# Patient Record
Sex: MALE | Race: WHITE | NOT HISPANIC OR LATINO | Employment: FULL TIME | URBAN - METROPOLITAN AREA
[De-identification: names, ages, dates, MRNs, and addresses within clinical notes are randomized per-mention and may not be internally consistent; named-entity substitution may affect disease eponyms.]

---

## 2020-08-06 ENCOUNTER — TELEPHONE (OUTPATIENT)
Dept: SCHEDULING | Facility: IMAGING CENTER | Age: 55
End: 2020-08-06

## 2020-08-09 SDOH — HEALTH STABILITY: MENTAL HEALTH
STRESS IS WHEN SOMEONE FEELS TENSE, NERVOUS, ANXIOUS, OR CAN'T SLEEP AT NIGHT BECAUSE THEIR MIND IS TROUBLED. HOW STRESSED ARE YOU?: ONLY A LITTLE

## 2020-08-09 SDOH — HEALTH STABILITY: PHYSICAL HEALTH: ON AVERAGE, HOW MANY DAYS PER WEEK DO YOU ENGAGE IN MODERATE TO STRENUOUS EXERCISE (LIKE A BRISK WALK)?: 5 DAYS

## 2020-08-09 SDOH — ECONOMIC STABILITY: TRANSPORTATION INSECURITY
IN THE PAST 12 MONTHS, HAS THE LACK OF TRANSPORTATION KEPT YOU FROM MEDICAL APPOINTMENTS OR FROM GETTING MEDICATIONS?: NO

## 2020-08-09 SDOH — ECONOMIC STABILITY: HOUSING INSECURITY
IN THE LAST 12 MONTHS, WAS THERE A TIME WHEN YOU DID NOT HAVE A STEADY PLACE TO SLEEP OR SLEPT IN A SHELTER (INCLUDING NOW)?: NO

## 2020-08-09 SDOH — ECONOMIC STABILITY: HOUSING INSECURITY: IN THE LAST 12 MONTHS, HOW MANY PLACES HAVE YOU LIVED?: 3

## 2020-08-09 SDOH — ECONOMIC STABILITY: INCOME INSECURITY: IN THE LAST 12 MONTHS, WAS THERE A TIME WHEN YOU WERE NOT ABLE TO PAY THE MORTGAGE OR RENT ON TIME?: NO

## 2020-08-09 SDOH — HEALTH STABILITY: PHYSICAL HEALTH: ON AVERAGE, HOW MANY MINUTES DO YOU ENGAGE IN EXERCISE AT THIS LEVEL?: 120 MINUTES

## 2020-08-09 SDOH — ECONOMIC STABILITY: TRANSPORTATION INSECURITY
IN THE PAST 12 MONTHS, HAS LACK OF RELIABLE TRANSPORTATION KEPT YOU FROM MEDICAL APPOINTMENTS, MEETINGS, WORK OR FROM GETTING THINGS NEEDED FOR DAILY LIVING?: NO

## 2020-08-09 ASSESSMENT — SOCIAL DETERMINANTS OF HEALTH (SDOH)
DO YOU BELONG TO ANY CLUBS OR ORGANIZATIONS SUCH AS CHURCH GROUPS UNIONS, FRATERNAL OR ATHLETIC GROUPS, OR SCHOOL GROUPS?: NO
HOW OFTEN DO YOU GET TOGETHER WITH FRIENDS OR RELATIVES?: NEVER
HOW MANY DRINKS CONTAINING ALCOHOL DO YOU HAVE ON A TYPICAL DAY WHEN YOU ARE DRINKING: 1 OR 2
HOW OFTEN DO YOU HAVE A DRINK CONTAINING ALCOHOL: 2-4 TIMES A MONTH
HOW OFTEN DO YOU ATTEND CHURCH OR RELIGIOUS SERVICES?: NEVER
HOW HARD IS IT FOR YOU TO PAY FOR THE VERY BASICS LIKE FOOD, HOUSING, MEDICAL CARE, AND HEATING?: NOT HARD AT ALL
IN A TYPICAL WEEK, HOW MANY TIMES DO YOU TALK ON THE PHONE WITH FAMILY, FRIENDS, OR NEIGHBORS?: THREE TIMES A WEEK
WITHIN THE PAST 12 MONTHS, THE FOOD YOU BOUGHT JUST DIDN'T LAST AND YOU DIDN'T HAVE MONEY TO GET MORE: NEVER TRUE
WITHIN THE PAST 12 MONTHS, YOU WORRIED THAT YOUR FOOD WOULD RUN OUT BEFORE YOU GOT THE MONEY TO BUY MORE: NEVER TRUE
HOW OFTEN DO YOU ATTENT MEETINGS OF THE CLUB OR ORGANIZATION YOU BELONG TO?: NEVER
HOW OFTEN DO YOU HAVE SIX OR MORE DRINKS ON ONE OCCASION: NEVER

## 2020-08-13 ENCOUNTER — OFFICE VISIT (OUTPATIENT)
Dept: MEDICAL GROUP | Facility: PHYSICIAN GROUP | Age: 55
End: 2020-08-13
Payer: COMMERCIAL

## 2020-08-13 VITALS
RESPIRATION RATE: 16 BRPM | HEIGHT: 69 IN | BODY MASS INDEX: 26.45 KG/M2 | SYSTOLIC BLOOD PRESSURE: 128 MMHG | HEART RATE: 60 BPM | WEIGHT: 178.6 LBS | OXYGEN SATURATION: 95 % | DIASTOLIC BLOOD PRESSURE: 78 MMHG | TEMPERATURE: 97.4 F

## 2020-08-13 DIAGNOSIS — L98.9 NON-HEALING SKIN LESION: ICD-10-CM

## 2020-08-13 DIAGNOSIS — M76.62 ACHILLES TENDINITIS OF BOTH LOWER EXTREMITIES: ICD-10-CM

## 2020-08-13 DIAGNOSIS — M76.61 ACHILLES TENDINITIS OF BOTH LOWER EXTREMITIES: ICD-10-CM

## 2020-08-13 DIAGNOSIS — Z00.00 WELLNESS EXAMINATION: ICD-10-CM

## 2020-08-13 DIAGNOSIS — R79.89 ELEVATED FERRITIN LEVEL: ICD-10-CM

## 2020-08-13 DIAGNOSIS — Z23 NEED FOR VACCINATION: ICD-10-CM

## 2020-08-13 PROCEDURE — 90750 HZV VACC RECOMBINANT IM: CPT | Performed by: PHYSICIAN ASSISTANT

## 2020-08-13 PROCEDURE — 90715 TDAP VACCINE 7 YRS/> IM: CPT | Performed by: PHYSICIAN ASSISTANT

## 2020-08-13 PROCEDURE — 90472 IMMUNIZATION ADMIN EACH ADD: CPT | Performed by: PHYSICIAN ASSISTANT

## 2020-08-13 PROCEDURE — 90471 IMMUNIZATION ADMIN: CPT | Performed by: PHYSICIAN ASSISTANT

## 2020-08-13 PROCEDURE — 99204 OFFICE O/P NEW MOD 45 MIN: CPT | Mod: 25 | Performed by: PHYSICIAN ASSISTANT

## 2020-08-13 RX ORDER — ASPIRIN 81 MG/1
TABLET ORAL
COMMUNITY

## 2020-08-13 RX ORDER — MELOXICAM 15 MG/1
15 TABLET ORAL DAILY
Qty: 30 TAB | Refills: 0 | Status: SHIPPED | OUTPATIENT
Start: 2020-08-13 | End: 2020-09-08

## 2020-08-13 ASSESSMENT — PATIENT HEALTH QUESTIONNAIRE - PHQ9: CLINICAL INTERPRETATION OF PHQ2 SCORE: 0

## 2020-08-13 NOTE — PROGRESS NOTES
Chief Complaint   Patient presents with   • Establish Care   • Rash     L upper lip area    • Tendonitis     L        HISTORY OF THE PRESENT ILLNESS: Eva Rizo is a 55 y.o. male new patient to our practice. This pleasant patient is here today to establish care and to discuss the evaluation and management of:    Achilles tendinitis of both lower extremities  Patient is a pleasant 55-year-old male here today discuss Achilles pain.  He tells me several months ago he started working on high intensity training.  States he is working on increasing his time and since doing that he has been experiencing pain of bilateral Achilles.  States on average she was running 15-20 miles per week but this was nothing abnormal for him.  States he is tried over-the-counter anti-inflammatories intermittently with no improvement of symptoms.  States he is also done at home physical therapy exercises with no improvement symptoms.  States he has not been running for several weeks now and would like to get back to running.  He needs to wearing supportive shoes.  Denies trauma.  Patient is inquiring what treatment options.    Elevated ferritin level  Patient states he has a positive medical history for elevated ferritin levels.  States he has been getting lab work every 6 months and ferritin levels have been trending downward.  He tells me that he is asymptomatic.  Will repeat lab work.    Non-healing skin lesion  Patient states about a month ago he developed a slightly smaller than a pea-sized bump above right upper lip just above his mustache.  States occasionally bump will burn and scab.  States is a nonhealing lesion.  Denies erythema/warmth/size changes/irregular borders.  He denies personal history of skin cancer.  Hemosiderin sunscreen.  Denies other suspicious lesions.      No past medical history on file.    Past Surgical History:   Procedure Laterality Date   • APPENDECTOMY     • CHOLECYSTECTOMY     • INGUINAL HERNIA REPAIR Right     • SPLENECTOMY         Family Status   Relation Name Status   • Mo  Alive   • Fa  Alive   • Sis  Alive   • Bro  Alive   • Sis  Alive   • Bro  Alive   • Bro  Alive   • Bro  Alive   • Son  Alive   • Liliana  Alive     Family History   Problem Relation Age of Onset   • No Known Problems Mother    • Hyperlipidemia Father    • Hypertension Father    • Stroke Father    • Heart Disease Father         CHF   • No Known Problems Son    • No Known Problems Daughter        Social History     Tobacco Use   • Smoking status: Never Smoker   • Smokeless tobacco: Never Used   Substance Use Topics   • Alcohol use: Yes     Frequency: 2-4 times a month     Drinks per session: 1 or 2     Binge frequency: Never     Comment: beer   • Drug use: Never       Allergies: Patient has no known allergies.    Current Outpatient Medications Ordered in Epic   Medication Sig Dispense Refill   • aspirin (ASPIRIN 81) 81 MG EC tablet Take  by mouth.     • meloxicam (MOBIC) 15 MG tablet Take 1 Tab by mouth every day. 30 Tab 0     No current Epic-ordered facility-administered medications on file.        Review of Systems   Constitutional: Negative for fever, chills, weight loss and malaise/fatigue.   HENT: Negative for ear pain, nosebleeds, congestion, sore throat and neck pain.    Eyes: Negative for blurred vision.   Respiratory: Negative for cough, sputum production, shortness of breath and wheezing.    Cardiovascular: Negative for chest pain, palpitations, orthopnea and leg swelling.   Gastrointestinal: Negative for heartburn, nausea, vomiting and abdominal pain.   Genitourinary: Negative for dysuria, urgency and frequency.   Musculoskeletal: Negative for myalgias, back pain and joint pain.  Positive for bilateral Achilles pain.  Skin: Negative for rash and itching.  Positive for nonhealing lesion of upper lip.  Neurological: Negative for dizziness, tingling, tremors, sensory change, focal weakness and headaches.   Endo/Heme/Allergies: Does not  "bruise/bleed easily.   Psychiatric/Behavioral: Negative for depression, anxiety, or memory loss.     All other systems reviewed and are negative except as in HPI.    Exam: /78 (BP Location: Left arm, Patient Position: Sitting)   Pulse 60   Temp 36.3 °C (97.4 °F) (Temporal)   Resp 16   Ht 1.753 m (5' 9\")   Wt 81 kg (178 lb 9.6 oz)   SpO2 95%  Body mass index is 26.37 kg/m².  General: Normal appearing. No distress.  HEENT: Normocephalic. Eyes conjunctiva clear lids without ptosis, pupils equal and reactive to light accommodation, ears normal shape and contour, canals are clear bilaterally, tympanic membranes are benign, nasal mucosa benign, oropharynx is without erythema, edema or exudates.   Neck: Supple without JVD or bruit. Thyroid is not enlarged.  Pulmonary: Clear to ausculation.  Normal effort. No rales, ronchi, or wheezing.  Cardiovascular: Regular rate and rhythm without murmur. Carotid and radial pulses are intact and equal bilaterally.  Abdomen: Soft, nontender, nondistended. Normal bowel sounds. Liver and spleen are not palpable  Neurologic: Grossly nonfocal  Lymph: No cervical, supraclavicular or axillary lymph nodes are palpable  Skin: Warm and dry.  Negative for rash.  Positive for sinus more than a pea-sized firm mass above right upper lip with slight larger than pinhead-sized nonhealing lesion near the center of the mass.  Negative for erythema/warmth/tenderness with palpation.  Negative for irregular borders.  Mass is flesh-colored.  Musculoskeletal: Normal gait. No extremity cyanosis, clubbing, or edema.  Positive for pain over bilateral Achilles insertion points.  No signs of trauma.  Negative for erythema/warmth/swelling/discharge.  Psych: Normal mood and affect. Alert and oriented x3. Judgment and insight is normal.      Medical decision-making and discussion:  1. Achilles tendinitis of both lower extremities  Patient has been prescribed Mobic 50 mg tab has been advised to take 1 " tablet by mouth once daily with food for 2 weeks.  Continue at home physical therapy exercise.  Continue wearing supportive shoes.  Patient has been furred to sports medicine as well physical therapy for further evaluation.  Patient good plan.  Suggested 0 drop running shoes.  He tells me in the past that he wore 0 drop running shoes and did not experience unpleasant side effects or complications when doing so.    - meloxicam (MOBIC) 15 MG tablet; Take 1 Tab by mouth every day.  Dispense: 30 Tab; Refill: 0  - REFERRAL TO PHYSICAL THERAPY Reason for Therapy: Eval/Treat/Report  - REFERRAL TO SPORTS MEDICINE    2. Elevated ferritin level  Chronic with stable problem.  Lab work has been ordered.  Patient be contacted with results.  Continue to monitor closely.    - CBC WITH DIFFERENTIAL; Future  - FERRITIN; Future  - IRON/TOTAL IRON BIND; Future    3. Non-healing skin lesion  Continue wearing sunscreen.  Avoid irritating area of concern.  Patient has been referred to dermatology.    - REFERRAL TO DERMATOLOGY    4. Need for vaccination  Shingrix and Tdap vaccinations were administered patient without complications.  Patient was provided VIS form.  - Tdap Vaccine =>8YO IM  - Shingrix Vaccine    5. Wellness examination    - CBC WITH DIFFERENTIAL; Future  - Comp Metabolic Panel; Future  - Lipid Profile; Future  - FERRITIN; Future  - IRON/TOTAL IRON BIND; Future    Tells me that his colonoscopy is up-to-date.  States he completed colonoscopy 2 years ago and there were no abnormal findings.  States colonoscopy was completed while he was living in [facility].    Please note that this dictation was created using voice recognition software. I have made every reasonable attempt to correct obvious errors, but I expect that there are errors of grammar and possibly content that I did not discover before finalizing the note.      Assessment/Plan  1. Achilles tendinitis of both lower extremities  meloxicam (MOBIC) 15 MG tablet     REFERRAL TO PHYSICAL THERAPY Reason for Therapy: Eval/Treat/Report    REFERRAL TO SPORTS MEDICINE   2. Elevated ferritin level  CBC WITH DIFFERENTIAL    FERRITIN    IRON/TOTAL IRON BIND   3. Non-healing skin lesion  REFERRAL TO DERMATOLOGY   4. Need for vaccination  Tdap Vaccine =>8YO IM    Shingrix Vaccine   5. Wellness examination  CBC WITH DIFFERENTIAL    Comp Metabolic Panel    Lipid Profile    FERRITIN    IRON/TOTAL IRON BIND       Return in about 1 year (around 8/13/2021), or if symptoms worsen or fail to improve.

## 2020-08-21 ENCOUNTER — OFFICE VISIT (OUTPATIENT)
Dept: MEDICAL GROUP | Facility: CLINIC | Age: 55
End: 2020-08-21
Payer: COMMERCIAL

## 2020-08-21 VITALS
HEIGHT: 69 IN | WEIGHT: 178.6 LBS | OXYGEN SATURATION: 98 % | DIASTOLIC BLOOD PRESSURE: 74 MMHG | SYSTOLIC BLOOD PRESSURE: 116 MMHG | BODY MASS INDEX: 26.45 KG/M2 | RESPIRATION RATE: 14 BRPM | HEART RATE: 74 BPM | TEMPERATURE: 98.2 F

## 2020-08-21 DIAGNOSIS — M76.61 ACHILLES TENDINITIS OF BOTH LOWER EXTREMITIES: ICD-10-CM

## 2020-08-21 DIAGNOSIS — M65.279: ICD-10-CM

## 2020-08-21 DIAGNOSIS — M76.62 ACHILLES TENDINITIS OF BOTH LOWER EXTREMITIES: ICD-10-CM

## 2020-08-21 PROCEDURE — 99203 OFFICE O/P NEW LOW 30 MIN: CPT | Mod: 25 | Performed by: FAMILY MEDICINE

## 2020-08-21 PROCEDURE — 76882 US LMTD JT/FCL EVL NVASC XTR: CPT | Mod: LT | Performed by: FAMILY MEDICINE

## 2020-08-21 ASSESSMENT — ENCOUNTER SYMPTOMS
FEVER: 0
CHILLS: 0
VOMITING: 0
NAUSEA: 0
DIZZINESS: 0
SHORTNESS OF BREATH: 0

## 2020-08-21 NOTE — PROGRESS NOTES
Subjective:      Darrius Velasquez is a 55 y.o. male who presents with Ankle Pain (Referral from PCP/ Bilateral achilles pain)     Referred by Jada Greene P.A.-C. for evaluation of BILATERAL achilles pain (LEFT > RIGHT)    HPI   BILATERAL achilles pain (LEFT > RIGHT)  Right heel started April 29, 2020 after starting interval workouts  Left heel started May 7, 2020  Pain is predominantly at the Achilles insertion  Pain is sharp when he runs  Improved with rest  Occasionally takes  Has progressed with eccentric exercises as per physical therapy and iron  Meloxicam has not helped much  POSITIVE prior history of LEFT great toe fracture a few months prior to his Achilles pain  No night symptoms  Denies any prior issues with Achilles or lower extremity injuries other than the LEFT great toe mentioned above    Has not been running since early July (roughly 6-7 weeks)  Started physical therapy May 23 in [place]  Had PT for 2 months, stopped at the end of July    Works at the [employer], [employer] work  Likes to run, 10 miles or so on trails on weekends and a few miles during the week  Likes swimming, lifting weights    Review of Systems   Constitutional: Negative for chills and fever.   Respiratory: Negative for shortness of breath.    Cardiovascular: Negative for chest pain.   Gastrointestinal: Negative for nausea and vomiting.   Neurological: Negative for dizziness.     PMH:  has no past medical history on file.  MEDS:   Current Outpatient Medications:   •  aspirin (ASPIRIN 81) 81 MG EC tablet, Take  by mouth., Disp: , Rfl:   •  meloxicam (MOBIC) 15 MG tablet, Take 1 Tab by mouth every day., Disp: 30 Tab, Rfl: 0  ALLERGIES: No Known Allergies  SURGHX:   Past Surgical History:   Procedure Laterality Date   • APPENDECTOMY     • CHOLECYSTECTOMY     • INGUINAL HERNIA REPAIR Right    • MENISCECTOMY Right    • SPLENECTOMY       SOCHX:  reports that he has never smoked. He has never used smokeless tobacco. He reports  "current alcohol use. He reports that he does not use drugs.  FH: Family history was reviewed, no pertinent findings to report     Objective:     /74 (BP Location: Right arm, Patient Position: Sitting, BP Cuff Size: Adult)   Pulse 74   Temp 36.8 °C (98.2 °F) (Temporal)   Resp 14   Ht 1.753 m (5' 9\")   Wt 81 kg (178 lb 9.6 oz)   SpO2 98%   BMI 26.37 kg/m²      Physical Exam     RIGHT ANKLE:  There is NO swelling noted at the ankle  Range of motion intact with dorsiflexion and plantarflexion, inversion and eversion  There is NO tenderness of the ATFL, CF or PTF ligament  There is NO tenderness of the lateral malleolus or medial malleolus  Anterior drawer testing is NEGATIVE  Talar tilt testing is NEGATIVE  The foot and ankle is otherwise neurovascularly intact    RIGHT FOOT:  There is NO swelling noted at the foot  There is NO tenderness at the base of the fifth metatarsal, cuboid, or tarsal navicular  There is NO pain with metatarsal squeeze test    LEFT ANKLE:  There is MILD swelling noted at the ankle along the insertion of the Achilles  Range of motion intact with dorsiflexion and plantarflexion, inversion and eversion  There is NO tenderness of the ATFL, CF or PTF ligament  There is NO tenderness of the lateral malleolus or medial malleolus  POSITIVE tenderness along the insertion of the Achilles particularly at the lateral aspect  Anterior drawer testing is NEGATIVE  Talar tilt testing is NEGATIVE  The foot and ankle is otherwise neurovascularly intact    LEFT FOOT:  There is NO swelling noted at the foot  There is NO tenderness at the base of the fifth metatarsal, cuboid, or tarsal navicular  There is NO pain with metatarsal squeeze test    NEUTRAL stance  Able to ambulate with minimally antalgic gait       Assessment/Plan:        1. Achilles tendinitis of both lower extremities (worse than LEFT, insertional)     2. Calcific tendinitis of ankle, BOTH ankles       LEFT ACHILLES worse than the " right  Likes to run, a few miles during the week and likes 10 mile runs on the weekends    Recommended he space out his running with non-offending activities such as cycling or swimming  Recommended orthotic and provided information on super feet orthotic    He has already undergone formal physical therapy prior to moving here from [place]  Recommended continuing eccentric strengthening program and balance training    We discussed the option of PRP versus Tenex  Given the size of the calcifications, he may be a better candidate for Tenex procedure    Return in about 6 weeks (around 10/2/2020).  To see how he is doing                        Thank you Jada Greene P.A.-C. for allowing me to participate in caring for your patient.

## 2020-08-21 NOTE — PROCEDURES
Musculoskeletal ultrasound evaluation of the LEFT and right Achilles tendon  Short and long axis views obtained  Calcification noted at the insertion of BOTH Achilles tendons with anechoic regions along the insertion points  Consistent with calcific tendinopathy

## 2020-08-21 NOTE — PATIENT INSTRUCTIONS
Superfeet orthotics can be purchased online, at any running store or at [facility]

## 2020-09-06 DIAGNOSIS — M76.61 ACHILLES TENDINITIS OF BOTH LOWER EXTREMITIES: ICD-10-CM

## 2020-09-06 DIAGNOSIS — M76.62 ACHILLES TENDINITIS OF BOTH LOWER EXTREMITIES: ICD-10-CM

## 2020-09-08 RX ORDER — MELOXICAM 15 MG/1
TABLET ORAL
Qty: 30 TAB | Refills: 0 | Status: SHIPPED | OUTPATIENT
Start: 2020-09-08 | End: 2021-09-30

## 2020-09-18 ENCOUNTER — OFFICE VISIT (OUTPATIENT)
Dept: MEDICAL GROUP | Facility: CLINIC | Age: 55
End: 2020-09-18
Payer: COMMERCIAL

## 2020-09-18 VITALS
OXYGEN SATURATION: 97 % | WEIGHT: 178.6 LBS | HEART RATE: 71 BPM | BODY MASS INDEX: 26.45 KG/M2 | HEIGHT: 69 IN | RESPIRATION RATE: 16 BRPM | DIASTOLIC BLOOD PRESSURE: 78 MMHG | SYSTOLIC BLOOD PRESSURE: 118 MMHG | TEMPERATURE: 97.2 F

## 2020-09-18 DIAGNOSIS — M76.61 ACHILLES TENDINITIS OF BOTH LOWER EXTREMITIES: ICD-10-CM

## 2020-09-18 DIAGNOSIS — M76.62 ACHILLES TENDINITIS OF BOTH LOWER EXTREMITIES: ICD-10-CM

## 2020-09-18 DIAGNOSIS — M65.279: ICD-10-CM

## 2020-09-18 PROCEDURE — 99213 OFFICE O/P EST LOW 20 MIN: CPT | Performed by: FAMILY MEDICINE

## 2020-09-18 NOTE — PATIENT INSTRUCTIONS
Tenex versus PRP    [email or web address]    [email or web address]

## 2020-09-18 NOTE — PROGRESS NOTES
"Subjective:      Darrius Velasquez is a 55 y.o. male who presents with Ankle Pain (Referral from PCP/ Bilateral achilles pain)     Referred by Jada Greene P.A.-C. for evaluation of BILATERAL achilles pain (LEFT > RIGHT)    HPI   BILATERAL achilles pain (LEFT > RIGHT)  Right heel started April 29, 2020 after starting interval workouts  Left heel started May 7, 2020  Pain is predominantly at the Achilles insertion  Improved with rest  Has progressed with eccentric exercises as per physical therapy and iron  Denies any prior issues with Achilles or lower extremity injuries other than the LEFT great toe mentioned above    Tolerating 4 miles in orthotics, helped some  Weights, cross training  Done with meloxicam and then plateaued   Eccentric exercises, felt stronger with them   Running is his worse activity    Has not been running since early July (roughly 6-7 weeks)  He did some physical therapy in [place]  Had PT for 2 months, stopped at the end of July    Works at the [employer], [employer] work  Likes to run, 10 miles or so on trails on weekends and a few miles during the week  Likes swimming, lifting weights    Objective     /78 (BP Location: Left arm, Patient Position: Sitting, BP Cuff Size: Adult)   Pulse 71   Temp 36.2 °C (97.2 °F) (Temporal)   Resp 16   Ht 1.753 m (5' 9\")   Wt 81 kg (178 lb 9.6 oz)   SpO2 97%   BMI 26.37 kg/m²     Physical Exam     RIGHT ANKLE:  There is NO swelling noted at the ankle  Range of motion intact with dorsiflexion and plantarflexion, inversion and eversion  There is NO tenderness of the ATFL, CF or PTF ligament  There is NO tenderness of the lateral malleolus or medial malleolus  Anterior drawer testing is NEGATIVE  Talar tilt testing is NEGATIVE  The foot and ankle is otherwise neurovascularly intact    RIGHT FOOT:  There is NO swelling noted at the foot  There is NO tenderness at the base of the fifth metatarsal, cuboid, or tarsal navicular  There is NO pain " with metatarsal squeeze test    LEFT ANKLE:  There is MILD swelling noted at the ankle along the insertion of the Achilles  Range of motion intact with dorsiflexion and plantarflexion, inversion and eversion  There is NO tenderness of the ATFL, CF or PTF ligament  There is NO tenderness of the lateral malleolus or medial malleolus  POSITIVE tenderness along the insertion of the Achilles particularly at the lateral aspect  Anterior drawer testing is NEGATIVE  Talar tilt testing is NEGATIVE  The foot and ankle is otherwise neurovascularly intact    LEFT FOOT:  There is NO swelling noted at the foot  There is NO tenderness at the base of the fifth metatarsal, cuboid, or tarsal navicular  There is NO pain with metatarsal squeeze test    NEUTRAL stance  Able to ambulate with minimally NORMAL gait     Assessment/Plan:        1. Achilles tendinitis of both lower extremities (L > R)  REFERRAL TO PHYSICAL THERAPY Reason for Therapy: Eval/Treat/Report   2. Calcific tendinitis of ankle, BOTH ankles  REFERRAL TO PHYSICAL THERAPY Reason for Therapy: Eval/Treat/Report     LEFT ACHILLES worse than the right  Likes to run, a few miles during the week and likes 10 mile runs on the weekends  Unfortunately, he is only tolerating 4 mile runs    Recommended he space out his running with non-offending activities such as cycling or swimming  super feet orthotic helped some     Overall his progress has PLATEAUED    We discussed the option of PRP versus Tenex  Given the size of the calcifications, he may be a better candidate for Tenex procedure      He would like to try some more formal PT (ACTIVE), Omar  He will let us know if you would like to proceed with PRP versus Tenex procedure                        Thank you Jada Greene P.A.-C. for allowing me to participate in caring for your patient.

## 2020-10-22 ENCOUNTER — OFFICE VISIT (OUTPATIENT)
Dept: DERMATOLOGY | Facility: IMAGING CENTER | Age: 55
End: 2020-10-22
Payer: COMMERCIAL

## 2020-10-22 VITALS — WEIGHT: 175 LBS | TEMPERATURE: 97.8 F | BODY MASS INDEX: 25.92 KG/M2 | HEIGHT: 69 IN

## 2020-10-22 DIAGNOSIS — Q89.01 ASPLENIA: ICD-10-CM

## 2020-10-22 DIAGNOSIS — L90.5 SCAR: ICD-10-CM

## 2020-10-22 DIAGNOSIS — D58.0 HEREDITARY SPHEROCYTOSIS (HCC): ICD-10-CM

## 2020-10-22 PROCEDURE — 99202 OFFICE O/P NEW SF 15 MIN: CPT | Performed by: DERMATOLOGY

## 2020-10-22 NOTE — PROGRESS NOTES
CC: Skin Lesion    Subjective: new patient here for spot check on nose.    HPI/location: right below nostril, gray papule, crusty  Time present: 3-6 mos  Painful lesion: No  Itching lesion: No  Enlarging lesion: No  Anything make it better or worse? No  Has improved since appt was made.  Patient scratched at site and then it resolved. Has not recurred.    History of skin cancer: No  History of precancers/actinic keratoses: No  History of biopsies:yse, nevus abdomen and Lentigo maligna right cheek? 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    ROS: no fevers/chills. No itch.  No cough  DermPMH: no skin cancer/melanoma  No problem-specific Assessment & Plan notes found for this encounter.    Relevant PMH: healthy  Social: never smoker; moved to [place] from [place] recently.    PE: Gen:WDWN male in NAD.  Skin:  Focused exam. Normal exam of skin of Face/eyes/lips/neck/no neck LAD palpated  Scar on right lateral cheek well healed without pigment/nodularity.  Declined additional exam of skin    A/P: hx atypical proliferation, right facial cheek, lentigo maligna?, s/p excision now with healed scar, appearing without suspicious features today:  -reviewed excision path with patient  -reviewed signs/sx of ABCDEs/melanoma  -sunprotection reviewed    Nose papule, resolved:  -RTC for regrowth/suspicious signs/sx    I have reviewed medications relevant to my specialty.    F/u PRN, declined scheduled f/u appt

## 2021-03-05 ENCOUNTER — OFFICE VISIT (OUTPATIENT)
Dept: URGENT CARE | Facility: CLINIC | Age: 56
End: 2021-03-05
Payer: COMMERCIAL

## 2021-03-05 VITALS
SYSTOLIC BLOOD PRESSURE: 126 MMHG | DIASTOLIC BLOOD PRESSURE: 76 MMHG | WEIGHT: 182 LBS | HEIGHT: 69 IN | OXYGEN SATURATION: 95 % | HEART RATE: 70 BPM | TEMPERATURE: 97.5 F | RESPIRATION RATE: 18 BRPM | BODY MASS INDEX: 26.96 KG/M2

## 2021-03-05 DIAGNOSIS — H01.001 BLEPHARITIS OF RIGHT UPPER EYELID, UNSPECIFIED TYPE: ICD-10-CM

## 2021-03-05 PROCEDURE — 99214 OFFICE O/P EST MOD 30 MIN: CPT | Performed by: PHYSICIAN ASSISTANT

## 2021-03-05 RX ORDER — DOXYCYCLINE HYCLATE 100 MG
100 TABLET ORAL 2 TIMES DAILY
Qty: 20 TABLET | Refills: 0 | Status: SHIPPED | OUTPATIENT
Start: 2021-03-05 | End: 2021-03-05

## 2021-03-05 RX ORDER — ERYTHROMYCIN 5 MG/G
1 OINTMENT OPHTHALMIC
Qty: 3.5 G | Refills: 0 | Status: SHIPPED | OUTPATIENT
Start: 2021-03-05 | End: 2021-09-30

## 2021-03-05 RX ORDER — DOXYCYCLINE HYCLATE 100 MG
100 TABLET ORAL 2 TIMES DAILY
Qty: 20 TABLET | Refills: 0 | Status: SHIPPED | OUTPATIENT
Start: 2021-03-05 | End: 2021-03-15

## 2021-03-05 ASSESSMENT — ENCOUNTER SYMPTOMS
BLURRED VISION: 0
EYE PAIN: 0
EYE REDNESS: 0
PHOTOPHOBIA: 0
DOUBLE VISION: 0
EYE DISCHARGE: 0

## 2021-03-05 ASSESSMENT — VISUAL ACUITY: OU: 1

## 2021-03-05 NOTE — PROGRESS NOTES
"Subjective:   Darrius Velasquez is a 55 y.o. male who presents for Eye Problem (x1wk, right eye swelling, painful)        Patient presents with 7 days of right eyelid redness, pain, swelling.  He is a contact lens user and stopped wearing his contacts for a week, but then again tried to put lenses back in yesterday.  This is a fresh pair of lenses, but symptoms seem to worsen thereafter.  He is now back to wearing only contacts.  He denies eye redness, eye pain, eye swelling, vision changes, diplopia.  No nausea, vomiting, fevers, chills.  He has not been applying any topical medications or taking oral medications for symptoms.  Denies prior similar symptoms.    Review of Systems   Eyes: Negative for blurred vision, double vision, photophobia, pain, discharge and redness.       PMH:  has no past medical history on file.  MEDS:   Current Outpatient Medications:   •  doxycycline (VIBRAMYCIN) 100 MG Tab, Take 1 tablet by mouth 2 times a day for 10 days., Disp: 20 tablet, Rfl: 0  •  erythromycin 5 MG/GM Ointment, Apply 1 Application to both eyes every bedtime., Disp: 3.5 g, Rfl: 0  •  meloxicam (MOBIC) 15 MG tablet, TAKE 1 TABLET BY MOUTH EVERY DAY (Patient not taking: Reported on 10/22/2020), Disp: 30 Tab, Rfl: 0  •  aspirin (ASPIRIN 81) 81 MG EC tablet, Take  by mouth., Disp: , Rfl:   ALLERGIES: No Known Allergies  SURGHX:   Past Surgical History:   Procedure Laterality Date   • APPENDECTOMY     • CHOLECYSTECTOMY     • INGUINAL HERNIA REPAIR Right    • MENISCECTOMY Right    • SPLENECTOMY       SOCHX:  reports that he has never smoked. He has never used smokeless tobacco. He reports current alcohol use. He reports that he does not use drugs.  FH: Family history was reviewed, no pertinent findings to report   Objective:   /76 (BP Location: Left arm, Patient Position: Sitting, BP Cuff Size: Adult)   Pulse 70   Temp 36.4 °C (97.5 °F) (Temporal)   Resp 18   Ht 1.753 m (5' 9\")   Wt 82.6 kg (182 lb)   SpO2 95%   BMI " 26.88 kg/m²   Physical Exam  Vitals reviewed.   Constitutional:       General: He is not in acute distress.     Appearance: Normal appearance. He is well-developed. He is not toxic-appearing.   HENT:      Head: Normocephalic and atraumatic.      Right Ear: External ear normal.      Left Ear: External ear normal.      Nose: Nose normal.   Eyes:      General: Lids are everted, no foreign bodies appreciated. Vision grossly intact. Gaze aligned appropriately.        Comments: PERRLA, EOMI, no nystagmus.  No pain with extraocular movements, bilaterally.  No conjunctival injection bilaterally.  No intraocular discharge bilaterally.  Anterior chambers are clear nonbulging bilaterally.  Medial right lower lid is erythematous and edematous.  Lid margin is inflamed and there is some white crusting at the margin there is a whitishness at the center of the area of erythema.  This does not feel firm or fluctuant however.  Mildly tender to palpation.  Right tear duct, right nose and maxillary sinus nontender to palpation.   Cardiovascular:      Rate and Rhythm: Normal rate and regular rhythm.   Pulmonary:      Effort: Pulmonary effort is normal. No respiratory distress.      Breath sounds: No stridor.   Musculoskeletal:      Cervical back: Neck supple.   Skin:     General: Skin is warm and dry.      Capillary Refill: Capillary refill takes less than 2 seconds.   Neurological:      Mental Status: He is alert and oriented to person, place, and time.      Comments: CN2-12 grossly intact   Psychiatric:         Speech: Speech normal.         Behavior: Behavior normal.           Assessment/Plan:   1. Blepharitis of right upper eyelid, unspecified type  - doxycycline (VIBRAMYCIN) 100 MG Tab; Take 1 tablet by mouth 2 times a day for 10 days.  Dispense: 20 tablet; Refill: 0  - erythromycin 5 MG/GM Ointment; Apply 1 Application to both eyes every bedtime.  Dispense: 3.5 g; Refill: 0     This looks like blepharitis to me.  Other  considerations include but not limited to dacryocystitis, developing abscess.    Considerations discussed with patient.  It is possible that an abscess may be forming.  Patient started on topical and oral antibiotics.  Importance of warm compresses 4+ times a day discussed.  He should do these for 10 to 15 minutes at a time.  I would like him to obtain a microwavable heat pack and use that in a clean damp washcloth.  I also recommend washing the lid and lid margin 1-2 times a day.  He can use an OTC eyewash for this or Cetaphil body wash.  Continue to wear glasses only until symptoms fully resolved.  If no improvement in 48 hours, worsening symptoms at any point, or new symptoms develop return to clinic for reevaluation.  If abscess forms may require referral to ophthalmology for I&D.    If patient develops eye pain, pain with movement of the eye, elevated fevers, severe headache, vision changes, nausea, vomiting or other severe concerning symptoms I would like patient to go to the ER for reevaluation.    Differential diagnosis, natural history, supportive care, and indications for immediate follow-up discussed.

## 2021-03-15 DIAGNOSIS — Z23 NEED FOR VACCINATION: ICD-10-CM

## 2021-09-30 ENCOUNTER — OFFICE VISIT (OUTPATIENT)
Dept: MEDICAL GROUP | Facility: PHYSICIAN GROUP | Age: 56
End: 2021-09-30
Payer: COMMERCIAL

## 2021-09-30 VITALS
WEIGHT: 182.2 LBS | TEMPERATURE: 98.6 F | RESPIRATION RATE: 16 BRPM | OXYGEN SATURATION: 95 % | HEART RATE: 61 BPM | BODY MASS INDEX: 26.98 KG/M2 | HEIGHT: 69 IN | DIASTOLIC BLOOD PRESSURE: 60 MMHG | SYSTOLIC BLOOD PRESSURE: 112 MMHG

## 2021-09-30 DIAGNOSIS — M79.609 POPLITEAL PAIN: ICD-10-CM

## 2021-09-30 DIAGNOSIS — R79.89 ELEVATED FERRITIN: ICD-10-CM

## 2021-09-30 DIAGNOSIS — Z12.5 SCREENING FOR MALIGNANT NEOPLASM OF PROSTATE: ICD-10-CM

## 2021-09-30 DIAGNOSIS — Z00.00 WELLNESS EXAMINATION: ICD-10-CM

## 2021-09-30 DIAGNOSIS — M79.662 PAIN OF LEFT LOWER LEG: ICD-10-CM

## 2021-09-30 PROCEDURE — 99213 OFFICE O/P EST LOW 20 MIN: CPT | Performed by: PHYSICIAN ASSISTANT

## 2021-09-30 ASSESSMENT — PATIENT HEALTH QUESTIONNAIRE - PHQ9
5. POOR APPETITE OR OVEREATING: 0 - NOT AT ALL
CLINICAL INTERPRETATION OF PHQ2 SCORE: 2
SUM OF ALL RESPONSES TO PHQ QUESTIONS 1-9: 8

## 2021-10-13 NOTE — PROGRESS NOTES
"Chief Complaint   Patient presents with   • Knee Pain     5x weeks, \"pop\", comes and goes, runner, swells left knee   • Requesting Labs       HISTORY OF PRESENT ILLNESS: Darrius Velasquez is an established 56 y.o. male here to discuss the evaluation and management of:    Patient is a pleasant 56-year-old male here today discuss left knee pain.  He tells me 5 weeks ago while kneeling down left popliteal area popped and 12 hours later there was swelling of popliteal region.  Patient states she has been able to run for the past 2 weeks.  On average he runs 8-10 miles a week.  States when he was running after injuring left lower extremity he felt tightness and swelling of popliteal region.  States he experiences intermittent flareups a couple times a week where popliteal region feels tight and will swell.  He admits area of concern is nontender to palpation.  States pain symptoms occur if he steps the wrong way.  He has been treating symptoms with over-the-counter ibuprofen as needed and icing as well as wearing supportive shoes.  He denies muscle atrophy, muscle weakness, deformities, erythema/warmth.    Patient states 4-5 years ago he followed up with a hematologist in regards to elevated ferritin levels and was told he needed no further work-up.  Patient would like for ferritin levels to be reevaluated.  He is also requesting for annual lab work to be completed.    Patient states his diet is healthy and he lives an active lifestyle.      Patient Active Problem List    Diagnosis Date Noted   • Asplenia 10/22/2020   • Hereditary spherocytosis (HCC) 10/22/2020       Allergies:Patient has no known allergies.    Current Outpatient Medications   Medication Sig Dispense Refill   • aspirin (ASPIRIN 81) 81 MG EC tablet Take  by mouth.       No current facility-administered medications for this visit.       Social History     Tobacco Use   • Smoking status: Never Smoker   • Smokeless tobacco: Never Used   Vaping Use   • Vaping Use: " "Never used   Substance Use Topics   • Alcohol use: Yes     Comment: beer   • Drug use: Never       Family Status   Relation Name Status   • Mo  Alive   • Fa  Alive   • Sis  Alive   • Bro  Alive   • Sis  Alive   • Bro  Alive   • Bro  Alive   • Bro  Alive   • Son  Alive   • Liliana  Alive     Family History   Problem Relation Age of Onset   • No Known Problems Mother    • Hyperlipidemia Father    • Hypertension Father    • Stroke Father    • Heart Disease Father         CHF   • No Known Problems Son    • No Known Problems Daughter        ROS:  Review of Systems   Constitutional: Negative for fever, chills, weight loss and malaise/fatigue.   HENT: Negative for ear pain, nosebleeds, congestion, sore throat and neck pain.    Eyes: Negative for blurred vision.   Respiratory: Negative for cough, sputum production, shortness of breath and wheezing.    Cardiovascular: Negative for chest pain, palpitations, orthopnea and leg swelling.   Gastrointestinal: Negative for heartburn, nausea, vomiting and abdominal pain.   Genitourinary: Negative for dysuria, urgency and frequency.   Musculoskeletal: Negative for myalgias, back pain and joint pain.   Skin: Negative for rash and itching.   Neurological: Negative for dizziness, tingling, tremors, sensory change, focal weakness and headaches.   Endo/Heme/Allergies: Does not bruise/bleed easily.   Psychiatric/Behavioral: Negative for depression, suicidal ideas and memory loss.  The patient is not nervous/anxious and does not have insomnia.    All other systems reviewed and are negative except as in HPI.    Exam: /60 (BP Location: Left arm, Patient Position: Sitting, BP Cuff Size: Adult)   Pulse 61   Temp 37 °C (98.6 °F) (Temporal)   Resp 16   Ht 1.753 m (5' 9\")   Wt 82.6 kg (182 lb 3.2 oz)   SpO2 95%  Body mass index is 26.91 kg/m².  General: Normal appearing. No distress.  HEENT: Normocephalic. Eyes conjunctiva clear lids without ptosis,  ears normal shape and contour.  Neck: " Supple without JVD. Thyroid is not enlarged.  Pulmonary: Clear to ausculation.  Normal effort. No rales, ronchi, or wheezing.  Cardiovascular: Regular rate and rhythm without murmur.   Abdomen: Nondistended.   Neurologic: Grossly nonfocal.  Cranial nerves are normal.   Skin: Warm and dry.  No rashes or suspicious skin lesions.  Musculoskeletal: Normal gait. No extremity cyanosis, clubbing, or edema.  Left knee and left popliteal region is nontender to palpation.  No signs of trauma.  Negative for erythema/warmth/deformities.  Negative for swelling.  Psych: Normal mood and affect. Alert and oriented x3. Judgment and insight is normal.    Medical decision-making and discussion:  1. Popliteal pain  2. Pain of left lower leg  Acute.  Unknown etiology.  Patient has been referred to sports medicine for further evaluation.  Advised patient continue wearing supportive shoes, icing, elevating, compressing as needed, and over-the-counter analgesics as needed.  Advised patient use proper body mechanics.    - REFERRAL TO SPORTS MEDICINE    3. Wellness examination  Annual lab work has been ordered.  Patient be contacted with results.  If lab work results warrant a follow-up appointment, follow-up appoint will be made.    - CBC WITH DIFFERENTIAL; Future  - Comp Metabolic Panel; Future  - Lipid Profile; Future    4. Elevated ferritin  Chronic but stable problem.  Lab work has been ordered to reevaluate patient.  Patient be contacted with results.    - CBC WITH DIFFERENTIAL; Future  - FERRITIN; Future  - IRON/TOTAL IRON BIND; Future    5. Screening for malignant neoplasm of prostate    - PROSTATE SPECIFIC AG SCREENING; Future      Please note that this dictation was created using voice recognition software. I have made every reasonable attempt to correct obvious errors, but I expect that there are errors of grammar and possibly content that I did not discover before finalizing the note.    Assessment/Plan:  1. Popliteal pain   REFERRAL TO SPORTS MEDICINE   2. Pain of left lower leg  REFERRAL TO SPORTS MEDICINE   3. Wellness examination  CBC WITH DIFFERENTIAL    Comp Metabolic Panel    Lipid Profile   4. Elevated ferritin  CBC WITH DIFFERENTIAL    FERRITIN    IRON/TOTAL IRON BIND   5. Screening for malignant neoplasm of prostate  PROSTATE SPECIFIC AG SCREENING       No follow-ups on file.

## 2021-10-15 ENCOUNTER — OFFICE VISIT (OUTPATIENT)
Dept: SPORTS MEDICINE | Facility: CLINIC | Age: 56
End: 2021-10-15
Payer: COMMERCIAL

## 2021-10-15 VITALS
TEMPERATURE: 98.3 F | BODY MASS INDEX: 26.98 KG/M2 | HEIGHT: 69 IN | DIASTOLIC BLOOD PRESSURE: 76 MMHG | HEART RATE: 78 BPM | RESPIRATION RATE: 16 BRPM | SYSTOLIC BLOOD PRESSURE: 118 MMHG | WEIGHT: 182.2 LBS | OXYGEN SATURATION: 97 %

## 2021-10-15 DIAGNOSIS — M25.862 PATELLOFEMORAL DYSFUNCTION, LEFT: ICD-10-CM

## 2021-10-15 DIAGNOSIS — R29.898 POOR BODY MECHANICS: ICD-10-CM

## 2021-10-15 DIAGNOSIS — M67.952 TENDINOPATHY OF LEFT GLUTEUS MEDIUS: ICD-10-CM

## 2021-10-15 PROCEDURE — 99214 OFFICE O/P EST MOD 30 MIN: CPT | Performed by: FAMILY MEDICINE

## 2021-10-15 NOTE — PROGRESS NOTES
CHIEF COMPLAINT:  Darrius Velasquez male presenting at the request of Jada Greene P.A.-C.  for evaluation of knee pain.     Darrius Velasquez is complaining of left knee pain  Felt pop while lifting with bent knees against exercise ball  Pop and then Tightness posterior knee  DOI 2 months ago, roughly mid-August  Pain is at the posterior knee  Quality is pressure  Pain is non-radiating   Improved with rest  Aggravated by resting  Worse after running hills, 3 miles  Not having so much pain during running, but after his run he does take about a day and a half to recover with posterior knee pressure and entire knee swelling  no prior problems with this area in the past, but he has had prior meniscal tear in the RIGHT knee, 2013, and meniscectomy 9 months later  Prior Treatments: seen by PCP  Prior studies: NO Prior imaging has been done   Medications tried for pain include: ibuprofen (OTC)  Mechanical Symptom history: No Locking, intermittent popping with flexion extension of the knee  Limping which can lead to tightness in the leg    Works at the [employer], desk work  Likes to run, a few miles during the week  Before the summer and pain had been running long 10 miles or so on trails on weekends  Likes swimming, lifting weights    REVIEW OF SYSTEMS  No Nausea, No Vomiting, No Chest Pain, No Shortness of Breath, No Dizziness, No Headache      PAST MEDICAL HISTORY:   History reviewed. No pertinent past medical history.    PMH:  has no past medical history on file.  MEDS:   Current Outpatient Medications:   •  aspirin (ASPIRIN 81) 81 MG EC tablet, Take  by mouth., Disp: , Rfl:   ALLERGIES: No Known Allergies  SURGHX:   Past Surgical History:   Procedure Laterality Date   • APPENDECTOMY     • CHOLECYSTECTOMY     • INGUINAL HERNIA REPAIR Right    • MENISCECTOMY Right    • SPLENECTOMY       SOCHX:  reports that he has never smoked. He has never used smokeless tobacco. He reports current alcohol use. He reports that  "he does not use drugs.  FH: Family history was reviewed, no pertinent findings to report     PHYSICAL EXAM:  /76 (BP Location: Left arm, Patient Position: Sitting, BP Cuff Size: Adult)   Pulse 78   Temp 36.8 °C (98.3 °F) (Temporal)   Resp 16   Ht 1.753 m (5' 9\")   Wt 82.6 kg (182 lb 3.2 oz)   SpO2 97%   BMI 26.91 kg/m²      well-developed, well-nourished in no apparent distress, alert and oriented x 3.  Gait: normal     RIGHT Knee:  Slight Varus and No Swelling  Range of Motion Intact  Trace effusion  Patellar No tenderness and no apprehension  Medial Joint Line Non-tender and NEGATIVE Victorino  Lateral Joint Line Non-tender and NEGATIVE Victorino  Trace Laxity with Varus stress  Trace Laxity with Valgus stress  Lachman's testing is Trace  Posterior Drawer Testing is Trace  The leg is otherwise neurovascularly intact    LEFT Knee:  Slight Varus and No Swelling   Range of Motion Intact with POSITIVE crepitus at the patellofemoral joint  Trace effusion  Patellar No tenderness and no apprehension  Medial Joint Line Non-tender and NEGATIVE Victorino  Lateral Joint Line Non-tender and NEGATIVE Victorino  Trace Laxity with Varus stress  Trace Laxity with Valgus stress  Lachman's testing is Trace  Posterior Drawer Testing is Trace  The leg is otherwise neurovascularly intact    Additional Findings: Poor mechanics with single leg step ups on the LEFT compared to the right    1. Tendinopathy of left gluteus medius     2. Patellofemoral dysfunction, left     3. Poor body mechanics (LEFT lower extremity)       Felt pop while lifting with bent knees against exercise ball  Pop and then Tightness posterior knee  DOI 2 months ago, roughly mid-August    Demonstrated gluteus medius strengthening exercises including clamshells, reverse clamshells and advanced gluteus medius strengthening exercises    Offered formal physical therapy, patient would rather try exercises on his own  He was provided with hip " exercises    Recommend cycling in between his runs to give himself more of a cool off.  Recommend running on flat surfaces rather than hilly ones    Return in about 4 weeks (around 11/12/2021).  To see how he is doing with home exercise program and see if his knee pain has improved    Imaging has NOT been performed  We can consider knee series including sunrise views if symptoms persist or fail to improve    Thank you Jada Greene P.A.-C. for allowing me to participate in caring for your patient.

## 2021-11-15 ENCOUNTER — OFFICE VISIT (OUTPATIENT)
Dept: SPORTS MEDICINE | Facility: CLINIC | Age: 56
End: 2021-11-15
Payer: COMMERCIAL

## 2021-11-15 ENCOUNTER — APPOINTMENT (OUTPATIENT)
Dept: RADIOLOGY | Facility: IMAGING CENTER | Age: 56
End: 2021-11-15
Attending: FAMILY MEDICINE
Payer: COMMERCIAL

## 2021-11-15 VITALS
HEART RATE: 80 BPM | SYSTOLIC BLOOD PRESSURE: 118 MMHG | RESPIRATION RATE: 16 BRPM | DIASTOLIC BLOOD PRESSURE: 76 MMHG | HEIGHT: 69 IN | WEIGHT: 182.2 LBS | TEMPERATURE: 98.2 F | BODY MASS INDEX: 26.98 KG/M2 | OXYGEN SATURATION: 97 %

## 2021-11-15 DIAGNOSIS — M25.862 PATELLOFEMORAL DYSFUNCTION, LEFT: ICD-10-CM

## 2021-11-15 DIAGNOSIS — R29.898 POOR BODY MECHANICS: ICD-10-CM

## 2021-11-15 DIAGNOSIS — M67.952 TENDINOPATHY OF LEFT GLUTEUS MEDIUS: ICD-10-CM

## 2021-11-15 PROCEDURE — 73564 X-RAY EXAM KNEE 4 OR MORE: CPT | Mod: TC,LT | Performed by: FAMILY MEDICINE

## 2021-11-15 PROCEDURE — 99214 OFFICE O/P EST MOD 30 MIN: CPT | Performed by: FAMILY MEDICINE

## 2021-11-15 NOTE — PROGRESS NOTES
"CHIEF COMPLAINT:  Darrius Velasquez male presenting at the request of Jada Greene P.A.-C.  for evaluation of knee pain.     Darrius Velasquez is complaining of LEFT knee pain  Felt pop while lifting with bent knees against exercise ball  Pop and then Tightness posterior knee  DOI roughly mid-August    Pain is now mostly at the ANTERIOR/peripatellar knee  Quality is pressure  Pain is non-radiating   Improved with rest  Aggravated by resting  Worse after running hills  He has not tolerated running  Instead he has switched to mountain biking which has helped reduce his pain overall  He would like to return to some running    prior meniscal tear in the contralateral/RIGHT knee, 2013, and meniscectomy 9 months later  He has also had POSITIVE history of physical therapy for LEFT Achilles tendinopathy with Dr. Nelson ([facility] physical therapy)    Works at the [employer], desk work  Likes to run, a few miles during the week  Before the summer and pain had been running long 10 miles or so on trails on weekends  Likes swimming, lifting weights       PHYSICAL EXAM:  /76 (BP Location: Left arm, Patient Position: Sitting, BP Cuff Size: Adult)   Pulse 80   Temp 36.8 °C (98.2 °F) (Temporal)   Resp 16   Ht 1.753 m (5' 9\")   Wt 82.6 kg (182 lb 3.2 oz)   SpO2 97%   BMI 26.91 kg/m²      well-developed, well-nourished in no apparent distress, alert and oriented x 3.  Gait: normal     RIGHT Knee:  Slight Varus and No Swellingt  Posterior Drawer Testing is Trace  The leg is otherwise neurovascularly intact    LEFT Knee:  Slight Varus and No Swelling   Range of Motion Intact with POSITIVE crepitus at the patellofemoral joint  Trace effusion  The leg is otherwise neurovascularly intact    Additional Findings: Poor mechanics with single leg step ups on the LEFT compared to the right  DECREASED proprioception in the LEFT compared to the right    1. Tendinopathy of left gluteus medius  Referral to Physical Therapy    Referral " to Physical Therapy   2. Patellofemoral dysfunction, left  DX-KNEE COMPLETE 4+ LEFT    Referral to Physical Therapy    Referral to Physical Therapy   3. Poor body mechanics (LEFT lower extremity)  DX-KNEE COMPLETE 4+ LEFT    Referral to Physical Therapy    Referral to Physical Therapy     Felt pop while lifting with bent knees against exercise ball  Pop and then Tightness posterior knee  DOI 2 months ago, roughly mid-August    Demonstrated gluteus medius strengthening exercises including clamshells, reverse clamshells and advanced gluteus medius strengthening exercises    Offered formal physical therapy, patient would rather try exercises on his own  He was provided with hip exercises    Fortunately, he is tolerating mountain biking, but he would like to get back to running as well    Recommend he obtain formal gait analysis to help with his management/therapy since he has had issues with the LEFT knee as well as the ankle    Gait Analysis, [facility]   [phone]  LUCILLE Stewart, [email or web address]  Needs referral, [place] location  Referral for gait anaylsis    Referral to (Jesus), active PT  Active PT              11/15/2021 8:48 AM     HISTORY/REASON FOR EXAM:  Pain with Running, PLEASE INCLUDE BILATERaL SUNRISE VIEW ON [address].        TECHNIQUE/EXAM DESCRIPTION AND NUMBER OF VIEWS:  4 views of the LEFT knee.     COMPARISON: None     FINDINGS:  Bone mineralization is age appropriate. Bony alignment is anatomic. There is no evidence of acute fracture or dislocation. There are mild tricompartmental degenerative changes with joint space narrowing and osteophytic spurring.     IMPRESSION:     Mild osteophytic degenerative change without acute osseous abnormality.             Exam Ended: 11/15/21  8:48 AM Last Resulted: 11/15/21  9:04 AM           Interpreted in the office today with the patient    Thank you Jada Greene P.A.-C. for allowing me to participate in caring for your patient.

## 2022-08-30 SDOH — ECONOMIC STABILITY: FOOD INSECURITY: WITHIN THE PAST 12 MONTHS, THE FOOD YOU BOUGHT JUST DIDN'T LAST AND YOU DIDN'T HAVE MONEY TO GET MORE.: NEVER TRUE

## 2022-08-30 SDOH — ECONOMIC STABILITY: FOOD INSECURITY: WITHIN THE PAST 12 MONTHS, YOU WORRIED THAT YOUR FOOD WOULD RUN OUT BEFORE YOU GOT MONEY TO BUY MORE.: NEVER TRUE

## 2022-08-30 SDOH — ECONOMIC STABILITY: HOUSING INSECURITY: IN THE LAST 12 MONTHS, HOW MANY PLACES HAVE YOU LIVED?: 1

## 2022-08-30 SDOH — HEALTH STABILITY: MENTAL HEALTH
STRESS IS WHEN SOMEONE FEELS TENSE, NERVOUS, ANXIOUS, OR CAN'T SLEEP AT NIGHT BECAUSE THEIR MIND IS TROUBLED. HOW STRESSED ARE YOU?: TO SOME EXTENT

## 2022-08-30 SDOH — ECONOMIC STABILITY: INCOME INSECURITY: HOW HARD IS IT FOR YOU TO PAY FOR THE VERY BASICS LIKE FOOD, HOUSING, MEDICAL CARE, AND HEATING?: NOT HARD AT ALL

## 2022-08-30 SDOH — ECONOMIC STABILITY: INCOME INSECURITY: IN THE LAST 12 MONTHS, WAS THERE A TIME WHEN YOU WERE NOT ABLE TO PAY THE MORTGAGE OR RENT ON TIME?: NO

## 2022-08-30 SDOH — HEALTH STABILITY: PHYSICAL HEALTH: ON AVERAGE, HOW MANY DAYS PER WEEK DO YOU ENGAGE IN MODERATE TO STRENUOUS EXERCISE (LIKE A BRISK WALK)?: 3 DAYS

## 2022-08-30 SDOH — HEALTH STABILITY: PHYSICAL HEALTH: ON AVERAGE, HOW MANY MINUTES DO YOU ENGAGE IN EXERCISE AT THIS LEVEL?: 30 MIN

## 2022-08-30 SDOH — ECONOMIC STABILITY: TRANSPORTATION INSECURITY
IN THE PAST 12 MONTHS, HAS LACK OF TRANSPORTATION KEPT YOU FROM MEETINGS, WORK, OR FROM GETTING THINGS NEEDED FOR DAILY LIVING?: NO

## 2022-08-30 ASSESSMENT — SOCIAL DETERMINANTS OF HEALTH (SDOH)
IN A TYPICAL WEEK, HOW MANY TIMES DO YOU TALK ON THE PHONE WITH FAMILY, FRIENDS, OR NEIGHBORS?: MORE THAN THREE TIMES A WEEK
DO YOU BELONG TO ANY CLUBS OR ORGANIZATIONS SUCH AS CHURCH GROUPS UNIONS, FRATERNAL OR ATHLETIC GROUPS, OR SCHOOL GROUPS?: NO
WITHIN THE PAST 12 MONTHS, YOU WORRIED THAT YOUR FOOD WOULD RUN OUT BEFORE YOU GOT THE MONEY TO BUY MORE: NEVER TRUE
HOW OFTEN DO YOU ATTEND CHURCH OR RELIGIOUS SERVICES?: NEVER
HOW OFTEN DO YOU ATTEND CHURCH OR RELIGIOUS SERVICES?: NEVER
HOW MANY DRINKS CONTAINING ALCOHOL DO YOU HAVE ON A TYPICAL DAY WHEN YOU ARE DRINKING: 1 OR 2
DO YOU BELONG TO ANY CLUBS OR ORGANIZATIONS SUCH AS CHURCH GROUPS UNIONS, FRATERNAL OR ATHLETIC GROUPS, OR SCHOOL GROUPS?: NO
HOW OFTEN DO YOU ATTENT MEETINGS OF THE CLUB OR ORGANIZATION YOU BELONG TO?: NEVER
HOW OFTEN DO YOU GET TOGETHER WITH FRIENDS OR RELATIVES?: ONCE A WEEK
IN A TYPICAL WEEK, HOW MANY TIMES DO YOU TALK ON THE PHONE WITH FAMILY, FRIENDS, OR NEIGHBORS?: MORE THAN THREE TIMES A WEEK
HOW OFTEN DO YOU ATTENT MEETINGS OF THE CLUB OR ORGANIZATION YOU BELONG TO?: NEVER
HOW OFTEN DO YOU HAVE A DRINK CONTAINING ALCOHOL: 2-4 TIMES A MONTH
HOW HARD IS IT FOR YOU TO PAY FOR THE VERY BASICS LIKE FOOD, HOUSING, MEDICAL CARE, AND HEATING?: NOT HARD AT ALL
HOW OFTEN DO YOU GET TOGETHER WITH FRIENDS OR RELATIVES?: ONCE A WEEK
HOW OFTEN DO YOU HAVE SIX OR MORE DRINKS ON ONE OCCASION: NEVER

## 2022-08-30 ASSESSMENT — LIFESTYLE VARIABLES
HOW MANY STANDARD DRINKS CONTAINING ALCOHOL DO YOU HAVE ON A TYPICAL DAY: 1 OR 2
SKIP TO QUESTIONS 9-10: 1
HOW OFTEN DO YOU HAVE A DRINK CONTAINING ALCOHOL: 2-4 TIMES A MONTH
HOW OFTEN DO YOU HAVE SIX OR MORE DRINKS ON ONE OCCASION: NEVER
AUDIT-C TOTAL SCORE: 2

## 2022-09-02 ENCOUNTER — OFFICE VISIT (OUTPATIENT)
Dept: MEDICAL GROUP | Facility: PHYSICIAN GROUP | Age: 57
End: 2022-09-02
Payer: COMMERCIAL

## 2022-09-02 VITALS
HEART RATE: 57 BPM | TEMPERATURE: 97.1 F | DIASTOLIC BLOOD PRESSURE: 64 MMHG | SYSTOLIC BLOOD PRESSURE: 110 MMHG | WEIGHT: 181 LBS | HEIGHT: 69 IN | OXYGEN SATURATION: 97 % | BODY MASS INDEX: 26.81 KG/M2

## 2022-09-02 DIAGNOSIS — Z01.84 IMMUNITY STATUS TESTING: ICD-10-CM

## 2022-09-02 DIAGNOSIS — Q89.01 ASPLENIA: ICD-10-CM

## 2022-09-02 DIAGNOSIS — Z11.59 ENCOUNTER FOR HEPATITIS C SCREENING TEST FOR LOW RISK PATIENT: ICD-10-CM

## 2022-09-02 DIAGNOSIS — L81.9 PIGMENTED SKIN LESION OF UNCERTAIN NATURE: ICD-10-CM

## 2022-09-02 DIAGNOSIS — D58.0 HEREDITARY SPHEROCYTOSIS (HCC): ICD-10-CM

## 2022-09-02 DIAGNOSIS — Z76.89 ENCOUNTER TO ESTABLISH CARE: ICD-10-CM

## 2022-09-02 DIAGNOSIS — Z12.5 SCREENING FOR PROSTATE CANCER: ICD-10-CM

## 2022-09-02 PROCEDURE — 99214 OFFICE O/P EST MOD 30 MIN: CPT | Performed by: STUDENT IN AN ORGANIZED HEALTH CARE EDUCATION/TRAINING PROGRAM

## 2022-09-02 ASSESSMENT — PATIENT HEALTH QUESTIONNAIRE - PHQ9: CLINICAL INTERPRETATION OF PHQ2 SCORE: 0

## 2022-09-02 NOTE — PROGRESS NOTES
"Subjective:     CC:  Diagnoses of Encounter to establish care, Immunity status testing, Encounter for hepatitis C screening test for low risk patient, Hereditary spherocytosis (HCC), Screening for prostate cancer, Pigmented skin lesion of uncertain nature, and Asplenia were pertinent to this visit.    HISTORY OF THE PRESENT ILLNESS: Patient is a 57 y.o. male. This pleasant patient is here today to establish care. His/her prior PCP was Jada Greene PA-C.    1.  Hereditary spherocytosis  He did not follow-up on labs prescribed 6 months ago.    2.  Acquired asplenia  Patient believes he is up-to-date on his vaccinations however he will bring these at his next visit.    3.  Pigmented skin lesion of uncertain nature.  Located on the lateral aspect of the left calf.  This has been evolving.    Active Diagnosis:    Patient Active Problem List   Diagnosis    Asplenia    Hereditary spherocytosis (HCC)    Pigmented skin lesion of uncertain nature      Current Outpatient Medications Ordered in Epic   Medication Sig Dispense Refill    aspirin 81 MG EC tablet Take  by mouth.       No current Epic-ordered facility-administered medications on file.     ROS:   Gen: No fevers/chills, no changes in weight  HEENT: No changes in vision/hearing, sore throat.  Pulm: No cough, unexplained SOB.  CV: No chest pain/pressure, no palpitations  GI: No nausea/vomiting, no diarrhea  : No dysuria/nocturia  MSk: No myalgias  Skin: No rash/skin changes  Neuro: No headaches, no numbness/tingling  Heme/Lymph: no easy bruising      Objective:     Exam: /64 (BP Location: Right arm, Patient Position: Sitting, BP Cuff Size: Adult)   Pulse (!) 57   Temp 36.2 °C (97.1 °F) (Temporal)   Ht 1.753 m (5' 9\")   Wt 82.1 kg (181 lb)   SpO2 97%  Body mass index is 26.73 kg/m².    General: Normal appearing. No distress.  HEENT: Normocephalic. Eyes conjunctiva clear lids without ptosis. Pupils equal and reactive to light accommodation. Ears normal shape and " contour. Canals are clear bilaterally, tympanic membranes are benign. Nasal mucosa benign, oropharynx is without erythema, edema or exudates.   Neck: Supple without JVD. Thyroid is not enlarged.  Pulmonary: Clear to ausculation.  Normal effort. No rales, ronchi, or wheezing.  Cardiovascular: Regular rate and rhythm without murmur. Radial pulses are intact and equal bilaterally.  Abdomen: Nondistended   neurologic: Grossly nonfocal.  CN II through XII intact.  Lymph: No cervical or supraclavicular lymph nodes are palpable  Skin: Warm and dry.  There is a flat pigmented lesion greater than 7 mm of the left lateral calf.  It is of uneven coloration, uneven border and asymmetric.  Musculoskeletal: Normal gait. No extremity cyanosis, clubbing, or edema.  Psych: Normal mood and affect. Alert and oriented x3. Judgment and insight are normal.    A chaperone was offered to the patient during today's exam. Patient declined chaperone.    Labs:   No pertinent labs available.    Assessment & Plan:   57 y.o. male with the following -    1.  Hereditary spherocytosis  -Chronic.  -CBC  -iron panel    2.  Acquired asplenia  -I reviewed the importance of careful vaccination with the patient and urged him to bring his full vaccine records and with him on his next visit in about 2 weeks.    3.  Pigmented skin lesion of uncertain nature.  -Undiagnosed problem.  -We will schedule the patient for biopsy.    4.  Establish Care.  -  We discussed diet/exercise/healthy weight maintenance.  We discussed the need to always wear seatbelt.  I have encouraged regular dentistry.  -CBC  -CMP  -PSA  -Hep B AB  -Hepatitis C screen  -Lipid profile    Return in about 2 weeks (around 9/16/2022), or Skin biopsy.    Please note that this dictation was created using voice recognition software. I have made every reasonable attempt to correct obvious errors, but I expect that there are errors of grammar and possibly content that I did not discover before  finalizing the note.      Dejuan Barr PA-C 9/2/2022

## 2022-09-16 ENCOUNTER — OFFICE VISIT (OUTPATIENT)
Dept: MEDICAL GROUP | Facility: PHYSICIAN GROUP | Age: 57
End: 2022-09-16
Payer: COMMERCIAL

## 2022-09-16 ENCOUNTER — HOSPITAL ENCOUNTER (OUTPATIENT)
Facility: MEDICAL CENTER | Age: 57
End: 2022-09-16
Attending: STUDENT IN AN ORGANIZED HEALTH CARE EDUCATION/TRAINING PROGRAM
Payer: COMMERCIAL

## 2022-09-16 VITALS
TEMPERATURE: 98.3 F | OXYGEN SATURATION: 96 % | HEIGHT: 69 IN | BODY MASS INDEX: 26.45 KG/M2 | WEIGHT: 178.57 LBS | DIASTOLIC BLOOD PRESSURE: 70 MMHG | SYSTOLIC BLOOD PRESSURE: 126 MMHG | HEART RATE: 59 BPM

## 2022-09-16 DIAGNOSIS — L81.9 PIGMENTED SKIN LESION OF UNCERTAIN NATURE: ICD-10-CM

## 2022-09-16 LAB — PATHOLOGY CONSULT NOTE: NORMAL

## 2022-09-16 PROCEDURE — 11104 PUNCH BX SKIN SINGLE LESION: CPT | Performed by: STUDENT IN AN ORGANIZED HEALTH CARE EDUCATION/TRAINING PROGRAM

## 2022-09-16 PROCEDURE — 88305 TISSUE EXAM BY PATHOLOGIST: CPT

## 2022-09-16 NOTE — PROGRESS NOTES
"Subjective:     CC:  The encounter diagnosis was Pigmented skin lesion of uncertain nature.    HISTORY OF THE PRESENT ILLNESS: Patient is a 57 y.o. male. This pleasant patient is here today for excisional biopsy of a pigmented skin lesion of uncertain nature.  Location left lateral calf.  This lesion was initially reported on the patient's visit on 9/2/2022 and scheduled for excision and biopsy today.  Patient is concerned because this lesion has been evolving in size shape and color over the past many weeks.    Active Diagnosis:    Patient Active Problem List   Diagnosis    Asplenia    Hereditary spherocytosis (HCC)    Pigmented skin lesion of uncertain nature      Current Outpatient Medications Ordered in Epic   Medication Sig Dispense Refill    aspirin 81 MG EC tablet Take  by mouth.       No current Epic-ordered facility-administered medications on file.     ROS:   Gen: No fevers/chills, no changes in weight  See HPI.    Objective:     Exam: /70 (BP Location: Left arm, Patient Position: Sitting, BP Cuff Size: Adult)   Pulse (!) 59   Temp 36.8 °C (98.3 °F) (Temporal)   Ht 1.753 m (5' 9\")   Wt 81 kg (178 lb 9.2 oz)   SpO2 96%  Body mass index is 26.37 kg/m².    General: Normal appearing. No distress.  Skin: Warm and dry.  Lesion is noted on 9/2/2022.    A chaperone was offered to the patient during today's exam. Chaperone name: Caron Miguel MA  was present.    Labs:   No interval labs.    Assessment & Plan:   57 y.o. male with the following -    1.  Pigmented skin lesion of uncertain nature.  -Excision with pathology follow-up in clinic today.  See procedure note.  -1% lidocaine with epinephrine, 1 cc intralesional.    Return in about 1 week (around 9/23/2022).  With medical assistant for suture removal.    Please note that this dictation was created using voice recognition software. I have made every reasonable attempt to correct obvious errors, but I expect that there are errors of grammar and " possibly content that I did not discover before finalizing the note.      Dejuan Barr PA-C 9/16/2022

## 2022-09-16 NOTE — PROCEDURES
Location of lesion: Left lateral calf    Reason for removal: Pigmented skin lesion of uncertain nature.  Evolving.    Discussed with the patient the risks, benefits and alternatives to excision of the lesion. Informed consent was obtained. The area was alcohol swabbed and 1 cc of 1% lidocaine with epinephrine was administered. The area was then prepped and draped in the usual sterile fashion. An 8 mm punch  was used to excise the lesion. The excision was approximately 8 mm, round.  The specimen was placed in a pathology jar and sent to the lab.  Hemostasis was obtained with gentle pressure.  3 sutures were placed.  Antibiotic ointment and bandage was applied. The patient was given wound care instructions and follow-up precautions.    Patient tolerated the procedure well.    Dejuan Barr PA-C

## 2022-09-24 NOTE — PROGRESS NOTES
Patient reports today that he is tested positive for COVID-19.  He has a history of surgical asplenia secondary to hereditary spherocytosis.    No CMP available.  No reason to think that this patient has hepatorenal dysfunction.    -Paxlovid therapy pack for GFR greater than 60.  Dispense 1.  Refill 0.    Dejuan Barr PA-C

## 2022-10-24 ENCOUNTER — HOSPITAL ENCOUNTER (OUTPATIENT)
Dept: LAB | Facility: MEDICAL CENTER | Age: 57
End: 2022-10-24
Attending: STUDENT IN AN ORGANIZED HEALTH CARE EDUCATION/TRAINING PROGRAM
Payer: COMMERCIAL

## 2022-10-24 ENCOUNTER — E-CONSULT (OUTPATIENT)
Dept: HEMATOLOGY ONCOLOGY | Facility: MEDICAL CENTER | Age: 57
End: 2022-10-24
Payer: COMMERCIAL

## 2022-10-24 DIAGNOSIS — Z12.5 SCREENING FOR PROSTATE CANCER: ICD-10-CM

## 2022-10-24 DIAGNOSIS — D58.0 HEREDITARY SPHEROCYTOSIS (HCC): ICD-10-CM

## 2022-10-24 DIAGNOSIS — Z11.59 ENCOUNTER FOR HEPATITIS C SCREENING TEST FOR LOW RISK PATIENT: ICD-10-CM

## 2022-10-24 DIAGNOSIS — Z76.89 ENCOUNTER TO ESTABLISH CARE: ICD-10-CM

## 2022-10-24 DIAGNOSIS — Z01.84 IMMUNITY STATUS TESTING: ICD-10-CM

## 2022-10-24 DIAGNOSIS — Z71.9 ENCOUNTER FOR CONSULTATION: ICD-10-CM

## 2022-10-24 LAB
ALBUMIN SERPL BCP-MCNC: 4.4 G/DL (ref 3.2–4.9)
ALBUMIN/GLOB SERPL: 1.5 G/DL
ALP SERPL-CCNC: 93 U/L (ref 30–99)
ALT SERPL-CCNC: 21 U/L (ref 2–50)
ANION GAP SERPL CALC-SCNC: 10 MMOL/L (ref 7–16)
AST SERPL-CCNC: 41 U/L (ref 12–45)
BILIRUB SERPL-MCNC: 2.3 MG/DL (ref 0.1–1.5)
BUN SERPL-MCNC: 12 MG/DL (ref 8–22)
CALCIUM SERPL-MCNC: 9.4 MG/DL (ref 8.5–10.5)
CHLORIDE SERPL-SCNC: 104 MMOL/L (ref 96–112)
CHOLEST SERPL-MCNC: 180 MG/DL (ref 100–199)
CO2 SERPL-SCNC: 27 MMOL/L (ref 20–33)
CREAT SERPL-MCNC: 0.68 MG/DL (ref 0.5–1.4)
ERYTHROCYTE [DISTWIDTH] IN BLOOD BY AUTOMATED COUNT: 49.2 FL (ref 35.9–50)
FASTING STATUS PATIENT QL REPORTED: NORMAL
FERRITIN SERPL-MCNC: 494 NG/ML (ref 22–322)
GFR SERPLBLD CREATININE-BSD FMLA CKD-EPI: 108 ML/MIN/1.73 M 2
GLOBULIN SER CALC-MCNC: 3 G/DL (ref 1.9–3.5)
GLUCOSE SERPL-MCNC: 92 MG/DL (ref 65–99)
HBV SURFACE AB SERPL IA-ACNC: 9.09 MIU/ML (ref 0–10)
HCT VFR BLD AUTO: 47.4 % (ref 42–52)
HCV AB SER QL: NORMAL
HDLC SERPL-MCNC: 42 MG/DL
HGB BLD-MCNC: 16.3 G/DL (ref 14–18)
IRON SATN MFR SERPL: 34 % (ref 15–55)
IRON SERPL-MCNC: 99 UG/DL (ref 50–180)
LDLC SERPL CALC-MCNC: 121 MG/DL
MCH RBC QN AUTO: 31.8 PG (ref 27–33)
MCHC RBC AUTO-ENTMCNC: 34.4 G/DL (ref 33.7–35.3)
MCV RBC AUTO: 92.4 FL (ref 81.4–97.8)
PLATELET # BLD AUTO: 423 K/UL (ref 164–446)
PMV BLD AUTO: 10 FL (ref 9–12.9)
POTASSIUM SERPL-SCNC: 4.1 MMOL/L (ref 3.6–5.5)
PROT SERPL-MCNC: 7.4 G/DL (ref 6–8.2)
PSA SERPL-MCNC: 3.25 NG/ML (ref 0–4)
RBC # BLD AUTO: 5.13 M/UL (ref 4.7–6.1)
SODIUM SERPL-SCNC: 141 MMOL/L (ref 135–145)
TIBC SERPL-MCNC: 295 UG/DL (ref 250–450)
TRIGL SERPL-MCNC: 86 MG/DL (ref 0–149)
UIBC SERPL-MCNC: 196 UG/DL (ref 110–370)
WBC # BLD AUTO: 8.2 K/UL (ref 4.8–10.8)

## 2022-10-24 PROCEDURE — 84153 ASSAY OF PSA TOTAL: CPT

## 2022-10-24 PROCEDURE — 83550 IRON BINDING TEST: CPT

## 2022-10-24 PROCEDURE — 80053 COMPREHEN METABOLIC PANEL: CPT

## 2022-10-24 PROCEDURE — G0472 HEP C SCREEN HIGH RISK/OTHER: HCPCS

## 2022-10-24 PROCEDURE — 82728 ASSAY OF FERRITIN: CPT

## 2022-10-24 PROCEDURE — 80061 LIPID PANEL: CPT

## 2022-10-24 PROCEDURE — 36415 COLL VENOUS BLD VENIPUNCTURE: CPT

## 2022-10-24 PROCEDURE — 85027 COMPLETE CBC AUTOMATED: CPT

## 2022-10-24 PROCEDURE — 86706 HEP B SURFACE ANTIBODY: CPT

## 2022-10-24 PROCEDURE — 83540 ASSAY OF IRON: CPT

## 2022-10-24 PROCEDURE — 99447 NTRPROF PH1/NTRNET/EHR 11-20: CPT | Performed by: INTERNAL MEDICINE

## 2022-10-24 NOTE — PROGRESS NOTES
E-Consult Response     After careful review of the patient's information available in the medical record, the following are my findings and recommendations:    Reason for consult:  57 male with h/o HS s/p splenectomy with elevated ferritin    Summary of data reviewed: Normal CBC with normal MCV. Ferritin 494. Bilirubin 2.3    Recommendations: L'd probably fractionate the bilirubin to see if mostly indirect and add an LDH, haptoglobin and reticulocyte count to see if he is still hemolyzing despite the splenectomy. Probably should do a hemochromatosis genetic screen just to be sure he doesn't have hereditary hemochromatosis. Unfortunately the one MRI machine in [place] that can be used to quantitate liver iron stores has been unavailable for months. If it ever comes back on line might be worth ordering - it's done at the [facility] facility on [address] but has been broken forever. Let me know when the rersults of the lab some in. Thanks.     E-Consult Time: 12 minutes were spent with >50% of the total time spent reviewing items outlined in the summary of data reviewed (Use code [id number])    Alex Bassett M.D.

## 2022-10-25 DIAGNOSIS — R79.89 ELEVATED FERRITIN: ICD-10-CM

## 2023-05-18 ENCOUNTER — HOSPITAL ENCOUNTER (OUTPATIENT)
Dept: LAB | Facility: MEDICAL CENTER | Age: 58
End: 2023-05-18
Attending: STUDENT IN AN ORGANIZED HEALTH CARE EDUCATION/TRAINING PROGRAM
Payer: COMMERCIAL

## 2023-05-18 DIAGNOSIS — R79.89 ELEVATED FERRITIN: ICD-10-CM

## 2023-05-18 LAB
BILIRUB CONJ SERPL-MCNC: 0.2 MG/DL (ref 0.1–0.5)
BILIRUB INDIRECT SERPL-MCNC: 2.1 MG/DL (ref 0–1)
BILIRUB SERPL-MCNC: 2.3 MG/DL (ref 0.1–1.5)
HGB RETIC QN AUTO: 32.4 PG/CELL (ref 29–35)
IMM RETICS NFR: 8.1 % (ref 9.3–17.4)
LDH SERPL L TO P-CCNC: 252 U/L (ref 107–266)
RETICS # AUTO: 0.06 M/UL (ref 0.04–0.06)
RETICS/RBC NFR: 5.3 % (ref 0.8–2.1)

## 2023-05-18 PROCEDURE — 83615 LACTATE (LD) (LDH) ENZYME: CPT

## 2023-05-18 PROCEDURE — 82248 BILIRUBIN DIRECT: CPT

## 2023-05-18 PROCEDURE — 36415 COLL VENOUS BLD VENIPUNCTURE: CPT

## 2023-05-18 PROCEDURE — 85046 RETICYTE/HGB CONCENTRATE: CPT

## 2023-05-18 PROCEDURE — 83010 ASSAY OF HAPTOGLOBIN QUANT: CPT

## 2023-05-18 PROCEDURE — 81256 HFE GENE: CPT

## 2023-05-18 PROCEDURE — 82247 BILIRUBIN TOTAL: CPT

## 2023-05-20 LAB — HAPTOGLOB SERPL-MCNC: 49 MG/DL (ref 30–200)

## 2023-05-22 LAB
HFE GENE MUT ANL BLD/T: NORMAL
HFE P.C282Y BLD/T QL: NEGATIVE
HFE P.H63D BLD/T QL: NEGATIVE
HFE P.S65C BLD/T QL: NEGATIVE

## 2023-06-07 DIAGNOSIS — D58.0 HEREDITARY SPHEROCYTOSIS (HCC): ICD-10-CM

## 2023-06-07 RX ORDER — FOLIC ACID 1 MG/1
1 TABLET ORAL DAILY
Qty: 90 TABLET | Refills: 3 | Status: SHIPPED | OUTPATIENT
Start: 2023-06-07

## 2023-07-14 ENCOUNTER — TELEPHONE (OUTPATIENT)
Dept: MEDICAL GROUP | Facility: PHYSICIAN GROUP | Age: 58
End: 2023-07-14
Payer: COMMERCIAL

## 2023-07-19 NOTE — TELEPHONE ENCOUNTER
Pt requesting genetic testing be done again please advise.    PAR NOTE:Patient called in regards of requesting the provider to send an order for his Genetic testing that was done on 05/18/2023. Please reach out to the patient for my further details. Thank you.

## 2023-08-21 ENCOUNTER — DOCUMENTATION (OUTPATIENT)
Dept: HEALTH INFORMATION MANAGEMENT | Facility: OTHER | Age: 58
End: 2023-08-21
Payer: COMMERCIAL

## 2023-12-22 ENCOUNTER — OFFICE VISIT (OUTPATIENT)
Dept: MEDICAL GROUP | Facility: IMAGING CENTER | Age: 58
End: 2023-12-22
Payer: COMMERCIAL

## 2023-12-22 VITALS
BODY MASS INDEX: 27.25 KG/M2 | TEMPERATURE: 97.5 F | HEART RATE: 60 BPM | WEIGHT: 184 LBS | OXYGEN SATURATION: 94 % | DIASTOLIC BLOOD PRESSURE: 76 MMHG | RESPIRATION RATE: 14 BRPM | SYSTOLIC BLOOD PRESSURE: 134 MMHG | HEIGHT: 69 IN

## 2023-12-22 DIAGNOSIS — E78.5 DYSLIPIDEMIA: ICD-10-CM

## 2023-12-22 DIAGNOSIS — Z13.1 DIABETES MELLITUS SCREENING: ICD-10-CM

## 2023-12-22 DIAGNOSIS — Z90.81 HISTORY OF SPLENECTOMY: ICD-10-CM

## 2023-12-22 DIAGNOSIS — Z23 NEED FOR VACCINATION: ICD-10-CM

## 2023-12-22 DIAGNOSIS — D58.0 HEREDITARY SPHEROCYTOSIS (HCC): ICD-10-CM

## 2023-12-22 DIAGNOSIS — Z11.4 SCREENING FOR HIV (HUMAN IMMUNODEFICIENCY VIRUS): ICD-10-CM

## 2023-12-22 DIAGNOSIS — Q89.01 ASPLENIA: ICD-10-CM

## 2023-12-22 DIAGNOSIS — Z12.5 PROSTATE CANCER SCREENING: ICD-10-CM

## 2023-12-22 DIAGNOSIS — R79.89 ELEVATED FERRITIN: ICD-10-CM

## 2023-12-22 PROCEDURE — 90619 MENACWY-TT VACCINE IM: CPT | Performed by: INTERNAL MEDICINE

## 2023-12-22 PROCEDURE — 90471 IMMUNIZATION ADMIN: CPT | Performed by: INTERNAL MEDICINE

## 2023-12-22 PROCEDURE — 99204 OFFICE O/P NEW MOD 45 MIN: CPT | Mod: 25 | Performed by: INTERNAL MEDICINE

## 2023-12-22 PROCEDURE — 3078F DIAST BP <80 MM HG: CPT | Performed by: INTERNAL MEDICINE

## 2023-12-22 PROCEDURE — 3075F SYST BP GE 130 - 139MM HG: CPT | Performed by: INTERNAL MEDICINE

## 2023-12-22 ASSESSMENT — PATIENT HEALTH QUESTIONNAIRE - PHQ9: CLINICAL INTERPRETATION OF PHQ2 SCORE: 0

## 2023-12-22 ASSESSMENT — FIBROSIS 4 INDEX: FIB4 SCORE: 1.25

## 2023-12-22 NOTE — PROGRESS NOTES
Established Patient    Darrius Velasquez is a 58 y.o. male who presents today with the following:    CC:   Chief Complaint   Patient presents with    Establish Care     PCP Momo       Requesting Labs     Iron and psa level        HPI:     Health Maintenance & Preventive Care     BP (age ? 18, every visit): BP normal  Lipid panel screening(age 40-75 q5yr): elevated LDL  Prediabetes/Diabetes screening (age 35-70 + OW/OB): ordered  Depression Screening: PHQ-2: mood good  ETOH use: one drink per week  Tobacco use: none  Unhealthy drug use: no know drug use  Safety relationship  AAA (65-75 M, ever smoker, Once): N/A    Cancer screening  Colorectal cancer screening (45-75,  FIT yearly or Stool DNA-FIT every 1-3 yrs or CT colonoscopy every 5 yrs or Colonoscopy every 10 yrs): when he is sixty  Prostate cancer screening: PSA (55-69, discuss potential benefits and harms of screening, Grade C): ordered  Lung cancer screening (M, age 50-80 yr, ? 20 ppd, quit <15yr): N/A    Infectious disease screening   GC/Chlamydia screening (All sexually active <24 and >25 at increased risk for infection): NA  HIV screening (age 15-65 & all pregnant pts): ordered  Hep C screening (age 18-79): up-to-date  STI screening (if at increased risks): N/A  TB screening (if at increased risks): N/A    Vaccinations  Flu vaccine: Up-to-date  Tdap: Up-to-date  Zoster Recombinant (age ? 50): up-to-date  COVID-19 vaccine: Pfizer 5 doses  Pneumococcal:   PCV20:  PCV15:  PPSV23: 2015  PCV13:        Problem   Dyslipidemia    Elevated LDL  Healthful lifestyle measures       History of Splenectomy   Hereditary Spherocytosis (Hcc)    Diagnosed since childhood  Splenectomy  Monitor CBC          Current Outpatient Medications   Medication Sig    folic acid (FOLVITE) 1 MG Tab Take 1 Tablet by mouth every day.    aspirin 81 MG EC tablet Take  by mouth.    Nirmatrelvir&Ritonavir 300/100 20 x 150 MG & 10 x 100MG Tablet Therapy Pack Take 300 mg nirmatrelvir (two 150  "mg tablets) with 100 mg ritonavir (one 100 mg tablet) by mouth, with all three tablets taken together twice daily for 5 days. (Patient not taking: Reported on 12/22/2023)     No Known Allergies    Allergies, past medical history, past surgical history, medications, family history, social history reviewed and updated.    ROS Please see HPI    Physical Exam  Vitals: /76 (BP Location: Left arm, Patient Position: Sitting, BP Cuff Size: Adult)   Pulse 60   Temp 36.4 °C (97.5 °F) (Temporal)   Resp 14   Ht 1.753 m (5' 9\")   Wt 83.5 kg (184 lb)   SpO2 94%   BMI 27.17 kg/m²   Physical Exam  Constitutional:       General: He is not in acute distress.     Appearance: Normal appearance.   HENT:      Head: Normocephalic and atraumatic.      Nose: Nose normal.      Mouth/Throat:      Pharynx: Oropharynx is clear.   Eyes:      Extraocular Movements: Extraocular movements intact.      Conjunctiva/sclera: Conjunctivae normal.   Cardiovascular:      Rate and Rhythm: Normal rate and regular rhythm.   Pulmonary:      Effort: Pulmonary effort is normal.      Breath sounds: Normal breath sounds.   Abdominal:      General: Bowel sounds are normal.      Palpations: Abdomen is soft.      Tenderness: There is no abdominal tenderness.   Musculoskeletal:      Right lower leg: No edema.      Left lower leg: No edema.   Neurological:      Mental Status: He is alert. Mental status is at baseline.   Psychiatric:         Mood and Affect: Mood normal.         Behavior: Behavior normal.         Thought Content: Thought content normal.         Judgment: Judgment normal.             Assessment and Plan    Darrius was seen today for establish care and requesting labs.    Diagnoses and all orders for this visit:    Dyslipidemia  -     Lipid Profile; Future  -     TSH WITH REFLEX TO FT4; Future    Elevated ferritin  -     FERRITIN; Future  -     IRON/TOTAL IRON BIND; Future    Hereditary spherocytosis (HCC)  -     CBC WITH DIFFERENTIAL; " Future  -     Comp Metabolic Panel; Future  -     BILIRUBIN DIRECT; Future  -     BILIRUBIN INDIRECT; Future    History of splenectomy  -     Meningococcal ACY&W-135 (MenQuadfi)    Asplenia  -     Meningococcal ACY&W-135 ([facility])    Need for vaccination  -     Meningococcal ACY&W-135 (MenQuadfi)  Trumenba in Feb 2024    Screening for HIV (human immunodeficiency virus)  -     HIV AG/AB COMBO ASSAY SCREENING; Future    Prostate cancer screening  -     PROSTATE SPECIFIC AG SCREENING; Future    Diabetes mellitus screening  -     Comp Metabolic Panel; Future      Patient Counseling:  --Discussed healthful lifestyle measures such as moderation in sodium/caffeine intake, saturated fat and cholesterol, caloric balance, sufficient fresh fruits/vegetables, fiber, vitamin D replacement, good sleep hygiene.  --Discussed brushing, flossing, and dental visits.   --Encouraged regular exercise.   --Injury prevention: Discussed safety belts, safety helmets, etc.        Follow-up:Return if symptoms worsen or fail to improve.    This note was created using voice recognition software. There may be unintended errors in spelling, grammar or content.

## 2024-01-17 ENCOUNTER — HOSPITAL ENCOUNTER (OUTPATIENT)
Dept: LAB | Facility: MEDICAL CENTER | Age: 59
End: 2024-01-17
Attending: INTERNAL MEDICINE
Payer: COMMERCIAL

## 2024-01-17 DIAGNOSIS — D58.0 HEREDITARY SPHEROCYTOSIS (HCC): ICD-10-CM

## 2024-01-17 DIAGNOSIS — Z13.1 DIABETES MELLITUS SCREENING: ICD-10-CM

## 2024-01-17 DIAGNOSIS — R79.89 ELEVATED FERRITIN: ICD-10-CM

## 2024-01-17 DIAGNOSIS — Z12.5 PROSTATE CANCER SCREENING: ICD-10-CM

## 2024-01-17 DIAGNOSIS — E78.5 DYSLIPIDEMIA: ICD-10-CM

## 2024-01-17 DIAGNOSIS — Z11.4 SCREENING FOR HIV (HUMAN IMMUNODEFICIENCY VIRUS): ICD-10-CM

## 2024-01-17 LAB
ALBUMIN SERPL BCP-MCNC: 4 G/DL (ref 3.2–4.9)
ALBUMIN/GLOB SERPL: 1.3 G/DL
ALP SERPL-CCNC: 84 U/L (ref 30–99)
ALT SERPL-CCNC: 19 U/L (ref 2–50)
ANION GAP SERPL CALC-SCNC: 10 MMOL/L (ref 7–16)
AST SERPL-CCNC: 29 U/L (ref 12–45)
BASOPHILS # BLD AUTO: 0.9 % (ref 0–1.8)
BASOPHILS # BLD: 0.07 K/UL (ref 0–0.12)
BILIRUB CONJ SERPL-MCNC: 0.3 MG/DL (ref 0.1–0.5)
BILIRUB INDIRECT SERPL-MCNC: 2.3 MG/DL (ref 0–1)
BILIRUB SERPL-MCNC: 2.6 MG/DL (ref 0.1–1.5)
BUN SERPL-MCNC: 15 MG/DL (ref 8–22)
CALCIUM ALBUM COR SERPL-MCNC: 9 MG/DL (ref 8.5–10.5)
CALCIUM SERPL-MCNC: 9 MG/DL (ref 8.5–10.5)
CHLORIDE SERPL-SCNC: 104 MMOL/L (ref 96–112)
CHOLEST SERPL-MCNC: 178 MG/DL (ref 100–199)
CO2 SERPL-SCNC: 28 MMOL/L (ref 20–33)
CREAT SERPL-MCNC: 0.69 MG/DL (ref 0.5–1.4)
EOSINOPHIL # BLD AUTO: 0.22 K/UL (ref 0–0.51)
EOSINOPHIL NFR BLD: 2.9 % (ref 0–6.9)
ERYTHROCYTE [DISTWIDTH] IN BLOOD BY AUTOMATED COUNT: 43.2 FL (ref 35.9–50)
FASTING STATUS PATIENT QL REPORTED: NORMAL
FERRITIN SERPL-MCNC: 363 NG/ML (ref 22–322)
GFR SERPLBLD CREATININE-BSD FMLA CKD-EPI: 106 ML/MIN/1.73 M 2
GLOBULIN SER CALC-MCNC: 3 G/DL (ref 1.9–3.5)
GLUCOSE SERPL-MCNC: 84 MG/DL (ref 65–99)
HCT VFR BLD AUTO: 46.3 % (ref 42–52)
HDLC SERPL-MCNC: 44 MG/DL
HGB BLD-MCNC: 16.2 G/DL (ref 14–18)
HIV 1+2 AB+HIV1 P24 AG SERPL QL IA: NORMAL
IMM GRANULOCYTES # BLD AUTO: 0.04 K/UL (ref 0–0.11)
IMM GRANULOCYTES NFR BLD AUTO: 0.5 % (ref 0–0.9)
IRON SATN MFR SERPL: 50 % (ref 15–55)
IRON SERPL-MCNC: 145 UG/DL (ref 50–180)
LDH SERPL L TO P-CCNC: 270 U/L (ref 107–266)
LDLC SERPL CALC-MCNC: 119 MG/DL
LYMPHOCYTES # BLD AUTO: 2.35 K/UL (ref 1–4.8)
LYMPHOCYTES NFR BLD: 30.8 % (ref 22–41)
MCH RBC QN AUTO: 31.6 PG (ref 27–33)
MCHC RBC AUTO-ENTMCNC: 35 G/DL (ref 32.3–36.5)
MCV RBC AUTO: 90.3 FL (ref 81.4–97.8)
MONOCYTES # BLD AUTO: 1 K/UL (ref 0–0.85)
MONOCYTES NFR BLD AUTO: 13.1 % (ref 0–13.4)
NEUTROPHILS # BLD AUTO: 3.96 K/UL (ref 1.82–7.42)
NEUTROPHILS NFR BLD: 51.8 % (ref 44–72)
NRBC # BLD AUTO: 0 K/UL
NRBC BLD-RTO: 0 /100 WBC (ref 0–0.2)
PLATELET # BLD AUTO: 447 K/UL (ref 164–446)
PMV BLD AUTO: 10 FL (ref 9–12.9)
POTASSIUM SERPL-SCNC: 4.3 MMOL/L (ref 3.6–5.5)
PROT SERPL-MCNC: 7 G/DL (ref 6–8.2)
PSA SERPL-MCNC: 2.15 NG/ML (ref 0–4)
RBC # BLD AUTO: 5.13 M/UL (ref 4.7–6.1)
SODIUM SERPL-SCNC: 142 MMOL/L (ref 135–145)
TIBC SERPL-MCNC: 292 UG/DL (ref 250–450)
TRIGL SERPL-MCNC: 73 MG/DL (ref 0–149)
TSH SERPL DL<=0.005 MIU/L-ACNC: 1.48 UIU/ML (ref 0.38–5.33)
UIBC SERPL-MCNC: 147 UG/DL (ref 110–370)
WBC # BLD AUTO: 7.6 K/UL (ref 4.8–10.8)

## 2024-01-17 PROCEDURE — 80053 COMPREHEN METABOLIC PANEL: CPT

## 2024-01-17 PROCEDURE — 36415 COLL VENOUS BLD VENIPUNCTURE: CPT

## 2024-01-17 PROCEDURE — 84153 ASSAY OF PSA TOTAL: CPT

## 2024-01-17 PROCEDURE — 80061 LIPID PANEL: CPT

## 2024-01-17 PROCEDURE — 83010 ASSAY OF HAPTOGLOBIN QUANT: CPT

## 2024-01-17 PROCEDURE — 85025 COMPLETE CBC W/AUTO DIFF WBC: CPT

## 2024-01-17 PROCEDURE — 82248 BILIRUBIN DIRECT: CPT

## 2024-01-17 PROCEDURE — 83550 IRON BINDING TEST: CPT

## 2024-01-17 PROCEDURE — 87389 HIV-1 AG W/HIV-1&-2 AB AG IA: CPT

## 2024-01-17 PROCEDURE — 84443 ASSAY THYROID STIM HORMONE: CPT

## 2024-01-17 PROCEDURE — 83615 LACTATE (LD) (LDH) ENZYME: CPT

## 2024-01-17 PROCEDURE — 83540 ASSAY OF IRON: CPT

## 2024-01-17 PROCEDURE — 82728 ASSAY OF FERRITIN: CPT

## 2024-01-19 ENCOUNTER — TELEPHONE (OUTPATIENT)
Dept: MEDICAL GROUP | Facility: IMAGING CENTER | Age: 59
End: 2024-01-19
Payer: COMMERCIAL

## 2024-01-19 DIAGNOSIS — D58.0 HEREDITARY SPHEROCYTOSIS (HCC): ICD-10-CM

## 2024-01-19 LAB — HAPTOGLOB SERPL-MCNC: 41 MG/DL (ref 30–200)

## 2024-01-20 NOTE — TELEPHONE ENCOUNTER
Latest Reference Range & Units 01/17/24 06:13   WBC 4.8 - 10.8 K/uL 7.6   RBC 4.70 - 6.10 M/uL 5.13   Hemoglobin 14.0 - 18.0 g/dL 16.2   Hematocrit 42.0 - 52.0 % 46.3   MCV 81.4 - 97.8 fL 90.3   MCH 27.0 - 33.0 pg 31.6   MCHC 32.3 - 36.5 g/dL 35.0   RDW 35.9 - 50.0 fL 43.2   Platelet Count 164 - 446 K/uL 447 (H)   MPV 9.0 - 12.9 fL 10.0   (H): Data is abnormally high     Latest Reference Range & Units 05/18/23 15:25 01/17/24 06:13   Total Bilirubin 0.1 - 1.5 mg/dL 2.3 (H) 2.6 (H)   Direct Bilirubin 0.1 - 0.5 mg/dL 0.2 0.3   Indirect Bilirubin 0.0 - 1.0 mg/dL 2.1 (H) 2.3 (H)   (H): Data is abnormally high     Latest Reference Range & Units 05/18/23 15:25 01/17/24 06:13   LDH Total 107 - 266 U/L 252 270 (H)   (H): Data is abnormally high   Latest Reference Range & Units 05/18/23 15:25 01/17/24 06:13   Ferritin 22.0 - 322.0 ng/mL  363.0 (H)   Haptoglobin 30 - 200 mg/dL 49 41   (H): Data is abnormally high        Hereditary spherocytosis (HCC)  -     Referral to Hematology Oncology

## 2024-01-20 NOTE — TELEPHONE ENCOUNTER
The 10-year ASCVD risk score (Edita OBRIEN, et al., 2019) is: 8.4%    Values used to calculate the score:      Age: 58 years      Sex: Male      Is Non- : No      Diabetic: No      Tobacco smoker: No      Systolic Blood Pressure: 134 mmHg      Is BP treated: No      HDL Cholesterol: 44 mg/dL      Total Cholesterol: 178 mg/dL    Healthful lifestyle measures  He does not want to initiate statin yet.  We discussed about plant sterols or red yeast rice supplement. He will look into them.

## 2024-02-23 ENCOUNTER — NON-PROVIDER VISIT (OUTPATIENT)
Dept: MEDICAL GROUP | Facility: IMAGING CENTER | Age: 59
End: 2024-02-23
Payer: COMMERCIAL

## 2024-02-23 DIAGNOSIS — Z23 NEED FOR VACCINATION: ICD-10-CM

## 2024-02-23 PROCEDURE — 99999 PR NO CHARGE: CPT | Performed by: INTERNAL MEDICINE

## 2024-02-23 PROCEDURE — 90471 IMMUNIZATION ADMIN: CPT | Performed by: INTERNAL MEDICINE

## 2024-02-23 PROCEDURE — 90621 MENB-FHBP VACC 2/3 DOSE IM: CPT | Performed by: INTERNAL MEDICINE

## 2024-02-23 NOTE — PROGRESS NOTES
"Darrius Velasquez is a 58 y.o. male here for a non-provider visit for:   TRUMENBA (Men B) 2 of 2    Reason for immunization: continue or complete series started at the office  Immunization records indicate need for vaccine: Yes, confirmed with personal records  Minimum interval has been met for this vaccine: Yes  ABN completed: Yes    VIS was given to patient: Yes  All IAC Questionnaire questions were answered \"No.\"    Patient tolerated injection and no adverse effects were observed or reported: Yes    Pt scheduled for next dose in series: Not Indicated  "

## 2024-08-05 ENCOUNTER — HOSPITAL ENCOUNTER (OUTPATIENT)
Facility: MEDICAL CENTER | Age: 59
End: 2024-08-06
Attending: EMERGENCY MEDICINE | Admitting: STUDENT IN AN ORGANIZED HEALTH CARE EDUCATION/TRAINING PROGRAM
Payer: COMMERCIAL

## 2024-08-05 ENCOUNTER — APPOINTMENT (OUTPATIENT)
Dept: RADIOLOGY | Facility: MEDICAL CENTER | Age: 59
End: 2024-08-05
Attending: EMERGENCY MEDICINE
Payer: COMMERCIAL

## 2024-08-05 ENCOUNTER — APPOINTMENT (OUTPATIENT)
Dept: SPORTS MEDICINE | Facility: OTHER | Age: 59
End: 2024-08-05
Payer: COMMERCIAL

## 2024-08-05 ENCOUNTER — APPOINTMENT (OUTPATIENT)
Dept: RADIOLOGY | Facility: OTHER | Age: 59
End: 2024-08-05
Attending: FAMILY MEDICINE
Payer: COMMERCIAL

## 2024-08-05 VITALS
BODY MASS INDEX: 26.66 KG/M2 | HEART RATE: 64 BPM | OXYGEN SATURATION: 94 % | RESPIRATION RATE: 16 BRPM | TEMPERATURE: 97.4 F | SYSTOLIC BLOOD PRESSURE: 120 MMHG | HEIGHT: 69 IN | WEIGHT: 180 LBS | DIASTOLIC BLOOD PRESSURE: 68 MMHG

## 2024-08-05 DIAGNOSIS — M79.605 PAIN OF LEFT LOWER EXTREMITY: ICD-10-CM

## 2024-08-05 DIAGNOSIS — M85.80 BONE EROSION DETERMINED BY X-RAY: ICD-10-CM

## 2024-08-05 DIAGNOSIS — Q89.01 ASPLENIA: ICD-10-CM

## 2024-08-05 DIAGNOSIS — M86.9 OSTEOMYELITIS OF LEFT FOOT, UNSPECIFIED TYPE (HCC): ICD-10-CM

## 2024-08-05 LAB
ALBUMIN SERPL BCP-MCNC: 4 G/DL (ref 3.2–4.9)
ALBUMIN/GLOB SERPL: 1.1 G/DL
ALP SERPL-CCNC: 104 U/L (ref 30–99)
ALT SERPL-CCNC: 35 U/L (ref 2–50)
ANION GAP SERPL CALC-SCNC: 14 MMOL/L (ref 7–16)
AST SERPL-CCNC: 32 U/L (ref 12–45)
BASOPHILS # BLD AUTO: 0.9 % (ref 0–1.8)
BASOPHILS # BLD: 0.11 K/UL (ref 0–0.12)
BILIRUB SERPL-MCNC: 1.7 MG/DL (ref 0.1–1.5)
BUN SERPL-MCNC: 18 MG/DL (ref 8–22)
CALCIUM ALBUM COR SERPL-MCNC: 9.4 MG/DL (ref 8.5–10.5)
CALCIUM SERPL-MCNC: 9.4 MG/DL (ref 8.5–10.5)
CHLORIDE SERPL-SCNC: 102 MMOL/L (ref 96–112)
CO2 SERPL-SCNC: 23 MMOL/L (ref 20–33)
CORTIS SERPL-MCNC: 3.3 UG/DL (ref 0–23)
CREAT SERPL-MCNC: 0.62 MG/DL (ref 0.5–1.4)
CRP SERPL HS-MCNC: <0.3 MG/DL (ref 0–0.75)
EOSINOPHIL # BLD AUTO: 0.41 K/UL (ref 0–0.51)
EOSINOPHIL NFR BLD: 3.4 % (ref 0–6.9)
ERYTHROCYTE [DISTWIDTH] IN BLOOD BY AUTOMATED COUNT: 41 FL (ref 35.9–50)
ERYTHROCYTE [SEDIMENTATION RATE] IN BLOOD BY WESTERGREN METHOD: 23 MM/HOUR (ref 0–20)
GFR SERPLBLD CREATININE-BSD FMLA CKD-EPI: 109 ML/MIN/1.73 M 2
GLOBULIN SER CALC-MCNC: 3.6 G/DL (ref 1.9–3.5)
GLUCOSE SERPL-MCNC: 110 MG/DL (ref 65–99)
HCT VFR BLD AUTO: 41.3 % (ref 42–52)
HGB BLD-MCNC: 15.3 G/DL (ref 14–18)
IMM GRANULOCYTES # BLD AUTO: 0.07 K/UL (ref 0–0.11)
IMM GRANULOCYTES NFR BLD AUTO: 0.6 % (ref 0–0.9)
LDH SERPL L TO P-CCNC: 380 U/L (ref 107–266)
LYMPHOCYTES # BLD AUTO: 3.96 K/UL (ref 1–4.8)
LYMPHOCYTES NFR BLD: 32.4 % (ref 22–41)
MCH RBC QN AUTO: 31.9 PG (ref 27–33)
MCHC RBC AUTO-ENTMCNC: 37 G/DL (ref 32.3–36.5)
MCV RBC AUTO: 86 FL (ref 81.4–97.8)
MONOCYTES # BLD AUTO: 1.47 K/UL (ref 0–0.85)
MONOCYTES NFR BLD AUTO: 12 % (ref 0–13.4)
NEUTROPHILS # BLD AUTO: 6.19 K/UL (ref 1.82–7.42)
NEUTROPHILS NFR BLD: 50.7 % (ref 44–72)
NRBC # BLD AUTO: 0 K/UL
NRBC BLD-RTO: 0 /100 WBC (ref 0–0.2)
PLATELET # BLD AUTO: 452 K/UL (ref 164–446)
PMV BLD AUTO: 9.9 FL (ref 9–12.9)
POTASSIUM SERPL-SCNC: 3.7 MMOL/L (ref 3.6–5.5)
PROCALCITONIN SERPL-MCNC: <0.05 NG/ML
PROT SERPL-MCNC: 7.6 G/DL (ref 6–8.2)
RBC # BLD AUTO: 4.8 M/UL (ref 4.7–6.1)
SODIUM SERPL-SCNC: 139 MMOL/L (ref 135–145)
WBC # BLD AUTO: 12.2 K/UL (ref 4.8–10.8)

## 2024-08-05 PROCEDURE — 83615 LACTATE (LD) (LDH) ENZYME: CPT

## 2024-08-05 PROCEDURE — 3078F DIAST BP <80 MM HG: CPT | Performed by: FAMILY MEDICINE

## 2024-08-05 PROCEDURE — 99285 EMERGENCY DEPT VISIT HI MDM: CPT

## 2024-08-05 PROCEDURE — 86140 C-REACTIVE PROTEIN: CPT

## 2024-08-05 PROCEDURE — 73630 X-RAY EXAM OF FOOT: CPT | Mod: TC,FY,LT | Performed by: RADIOLOGY

## 2024-08-05 PROCEDURE — 84145 PROCALCITONIN (PCT): CPT

## 2024-08-05 PROCEDURE — 99222 1ST HOSP IP/OBS MODERATE 55: CPT | Performed by: STUDENT IN AN ORGANIZED HEALTH CARE EDUCATION/TRAINING PROGRAM

## 2024-08-05 PROCEDURE — 83605 ASSAY OF LACTIC ACID: CPT

## 2024-08-05 PROCEDURE — 36415 COLL VENOUS BLD VENIPUNCTURE: CPT

## 2024-08-05 PROCEDURE — 3074F SYST BP LT 130 MM HG: CPT | Performed by: FAMILY MEDICINE

## 2024-08-05 PROCEDURE — 85610 PROTHROMBIN TIME: CPT

## 2024-08-05 PROCEDURE — 85025 COMPLETE CBC W/AUTO DIFF WBC: CPT

## 2024-08-05 PROCEDURE — 85652 RBC SED RATE AUTOMATED: CPT

## 2024-08-05 PROCEDURE — 99214 OFFICE O/P EST MOD 30 MIN: CPT | Performed by: FAMILY MEDICINE

## 2024-08-05 PROCEDURE — 770006 HCHG ROOM/CARE - MED/SURG/GYN SEMI*

## 2024-08-05 PROCEDURE — 73718 MRI LOWER EXTREMITY W/O DYE: CPT | Mod: LT

## 2024-08-05 PROCEDURE — 80053 COMPREHEN METABOLIC PANEL: CPT

## 2024-08-05 PROCEDURE — 82533 TOTAL CORTISOL: CPT

## 2024-08-05 RX ORDER — SODIUM CHLORIDE, SODIUM LACTATE, POTASSIUM CHLORIDE, CALCIUM CHLORIDE 600; 310; 30; 20 MG/100ML; MG/100ML; MG/100ML; MG/100ML
INJECTION, SOLUTION INTRAVENOUS CONTINUOUS
Status: DISCONTINUED | OUTPATIENT
Start: 2024-08-05 | End: 2024-08-06

## 2024-08-05 RX ORDER — SODIUM CHLORIDE, SODIUM LACTATE, POTASSIUM CHLORIDE, AND CALCIUM CHLORIDE .6; .31; .03; .02 G/100ML; G/100ML; G/100ML; G/100ML
500 INJECTION, SOLUTION INTRAVENOUS
Status: DISCONTINUED | OUTPATIENT
Start: 2024-08-05 | End: 2024-08-06 | Stop reason: HOSPADM

## 2024-08-05 RX ORDER — ONDANSETRON 4 MG/1
4 TABLET, ORALLY DISINTEGRATING ORAL EVERY 4 HOURS PRN
Status: DISCONTINUED | OUTPATIENT
Start: 2024-08-05 | End: 2024-08-06 | Stop reason: HOSPADM

## 2024-08-05 RX ORDER — ONDANSETRON 2 MG/ML
4 INJECTION INTRAMUSCULAR; INTRAVENOUS EVERY 4 HOURS PRN
Status: DISCONTINUED | OUTPATIENT
Start: 2024-08-05 | End: 2024-08-06 | Stop reason: HOSPADM

## 2024-08-05 RX ORDER — PROMETHAZINE HYDROCHLORIDE 25 MG/1
12.5-25 TABLET ORAL EVERY 4 HOURS PRN
Status: DISCONTINUED | OUTPATIENT
Start: 2024-08-05 | End: 2024-08-06 | Stop reason: HOSPADM

## 2024-08-05 RX ORDER — PROCHLORPERAZINE EDISYLATE 5 MG/ML
5-10 INJECTION INTRAMUSCULAR; INTRAVENOUS EVERY 4 HOURS PRN
Status: DISCONTINUED | OUTPATIENT
Start: 2024-08-05 | End: 2024-08-06 | Stop reason: HOSPADM

## 2024-08-05 RX ORDER — PROMETHAZINE HYDROCHLORIDE 25 MG/1
12.5-25 SUPPOSITORY RECTAL EVERY 4 HOURS PRN
Status: DISCONTINUED | OUTPATIENT
Start: 2024-08-05 | End: 2024-08-06 | Stop reason: HOSPADM

## 2024-08-05 RX ORDER — ACETAMINOPHEN 325 MG/1
650 TABLET ORAL EVERY 6 HOURS PRN
Status: DISCONTINUED | OUTPATIENT
Start: 2024-08-05 | End: 2024-08-06 | Stop reason: HOSPADM

## 2024-08-05 RX ORDER — LABETALOL HYDROCHLORIDE 5 MG/ML
10 INJECTION, SOLUTION INTRAVENOUS EVERY 4 HOURS PRN
Status: DISCONTINUED | OUTPATIENT
Start: 2024-08-05 | End: 2024-08-06 | Stop reason: HOSPADM

## 2024-08-05 RX ORDER — AMOXICILLIN 250 MG
2 CAPSULE ORAL EVERY EVENING
Status: DISCONTINUED | OUTPATIENT
Start: 2024-08-05 | End: 2024-08-06 | Stop reason: HOSPADM

## 2024-08-05 RX ORDER — LORAZEPAM 1 MG/1
1 TABLET ORAL
Status: DISCONTINUED | OUTPATIENT
Start: 2024-08-05 | End: 2024-08-06 | Stop reason: HOSPADM

## 2024-08-05 RX ORDER — POLYETHYLENE GLYCOL 3350 17 G/17G
1 POWDER, FOR SOLUTION ORAL
Status: DISCONTINUED | OUTPATIENT
Start: 2024-08-05 | End: 2024-08-06 | Stop reason: HOSPADM

## 2024-08-05 ASSESSMENT — ENCOUNTER SYMPTOMS
NAUSEA: 0
CHILLS: 0
VOMITING: 0
DIZZINESS: 0
SHORTNESS OF BREATH: 0
FEVER: 0

## 2024-08-05 ASSESSMENT — FIBROSIS 4 INDEX
FIB4 SCORE: 0.89
FIB4 SCORE: 0.89

## 2024-08-05 NOTE — PROGRESS NOTES
Chief Complaint   Patient presents with    Foot Pain     Injured left foot about 1 month ago, unsure of exact injury, pain has worsened the last 2 weeks.      Subjective     Self-Referred for evaluation of LEFT foot pain  Insidious onset, late June 2024  He had been sitting on that side on top of the foot for extended period of time  He ran 2 to 4 miles on the treadmill a few days per week after that  Initially his pain was mild, but symptoms worsened after running  Over the past 2 weeks symptoms have been progressively worsening  Worse with weightbearing and ambulation  Improved with rest  POSITIVE for night symptoms which causes difficulty sleeping  ibuprofen helps minimally  Icing has helped swelling is present, but symptoms can worsen if he is on his feet for extended periods of time     Works at the [employer], desk work  Likes to run, a few miles during the week  Before the summer and pain had been running long 10 miles or so on trails on weekends  Likes swimming, lifting weights    Review of Systems   Constitutional:  Negative for chills and fever.   Respiratory:  Negative for shortness of breath.    Cardiovascular:  Negative for chest pain.   Gastrointestinal:  Negative for nausea and vomiting.   Neurological:  Negative for dizziness.     PMH:  has no past medical history on file.  MEDS:   Current Outpatient Medications:     aspirin 81 MG EC tablet, Take  by mouth., Disp: , Rfl:     folic acid (FOLVITE) 1 MG Tab, Take 1 Tablet by mouth every day. (Patient not taking: Reported on 8/5/2024), Disp: 90 Tablet, Rfl: 3    Nirmatrelvir&Ritonavir 300/100 20 x 150 MG & 10 x 100MG Tablet Therapy Pack, Take 300 mg nirmatrelvir (two 150 mg tablets) with 100 mg ritonavir (one 100 mg tablet) by mouth, with all three tablets taken together twice daily for 5 days., Disp: 30 Each, Rfl: 0  ALLERGIES: No Known Allergies  SURGHX:   Past Surgical History:   Procedure Laterality Date    APPENDECTOMY       "CHOLECYSTECTOMY      INGUINAL HERNIA REPAIR Right     MENISCECTOMY Right     SPLENECTOMY      SPLENECTOMY       SOCHX:  reports that he has never smoked. He has never used smokeless tobacco. He reports current alcohol use. He reports that he does not use drugs.  FH: Family history was reviewed, no pertinent findings to report      Objective   /68 (BP Location: Left arm, Patient Position: Sitting, BP Cuff Size: Adult)   Pulse 64   Temp 36.3 °C (97.4 °F) (Temporal)   Resp 16   Ht 1.753 m (5' 9\")   Wt 81.6 kg (180 lb)   SpO2 94%   BMI 26.58 kg/m²     RIGHT ANKLE:  There is NO swelling noted at the ankle  Range of motion intact with dorsiflexion and plantarflexion, inversion and eversion  There is NO tenderness of the ATFL, CF or PTF ligament  There is NO tenderness of the lateral malleolus or medial malleolus  Anterior drawer testing is NEGATIVE  Talar tilt testing is NEGATIVE  The foot and ankle is otherwise neurovascularly intact    RIGHT FOOT:  There is NO swelling noted at the foot  There is NO tenderness at the base of the fifth metatarsal, cuboid, or tarsal navicular  There is NO pain with metatarsal squeeze test    LEFT ANKLE:  There is NO swelling noted at the ankle  Range of motion intact with dorsiflexion and plantarflexion, inversion and eversion  There is NO tenderness of the ATFL, CF or PTF ligament  There is NO tenderness of the lateral malleolus or medial malleolus  Anterior drawer testing is NEGATIVE  Talar tilt testing is NEGATIVE  The foot and ankle is otherwise neurovascularly intact    LEFT FOOT:  There is POSITIVE swelling noted at the foot  There is NO tenderness at the base of the fifth metatarsal, cuboid, or tarsal navicular  There is exquisite tenderness at the distal shaft of the LEFT fifth metatarsal with particular point tenderness at the fifth metatarsal head with POSITIVE tuning fork test  There is POSITIVE pain with metatarsal squeeze test at the fifth metatarsal head " "region    NEUTRAL stance  Able to ambulate with NORMAL gait    1. Pain of left lower extremity  DX-FOOT-COMPLETE 3+ LEFT        Insidious onset, late June 2024  He had been sitting on that side on top of the foot for extended period of time  He ran 2 to 4 miles on the treadmill a few days per week after that    Patient's only risk factor is history of splenectomy  To my knowledge, this does not increase his risk of osteomyelitis    He was fitted with a tall cam walker boot and crutches for ambulation as tolerated  Patient left the office BEFORE official x-ray report was read/reported    Return in about 1 week (around 8/12/2024).  Original plan was to see patient back in 1 week to see how he was doing in his cam walker boot  Since there is a suspicion of osteomyelitis, we did reach out to him to recommend further immediate workup to avoid further bone loss since he is a active/avid runner    ADDENDUM:  Tried reaching patient by phone called multiple times, but his mailbox was \"full\" and he did not answer.    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     COMPARISON:  None.     FINDINGS:  No fracture or dislocation.  No periosteal new bone formation.  Rarefaction is seen at the lateral margin of 5th metatarsal head with overlying soft tissue swelling.     IMPRESSION:     1.  No fracture or dislocation of LEFT foot.  2.  Focal bony resorption at the lateral margin 5th metatarsal head with overlying swelling concerning for inflammation/infection.           Exam Ended: 08/05/24  4:46 PM Last Resulted: 08/05/24  5:06 PM        Interpreted in the office today with the patient    "

## 2024-08-06 VITALS
SYSTOLIC BLOOD PRESSURE: 124 MMHG | WEIGHT: 184.3 LBS | DIASTOLIC BLOOD PRESSURE: 80 MMHG | HEART RATE: 64 BPM | OXYGEN SATURATION: 95 % | RESPIRATION RATE: 18 BRPM | BODY MASS INDEX: 27.22 KG/M2 | TEMPERATURE: 97.2 F

## 2024-08-06 PROBLEM — D72.829 LEUKOCYTOSIS: Status: ACTIVE | Noted: 2024-08-06

## 2024-08-06 LAB
CRP SERPL HS-MCNC: <0.3 MG/DL (ref 0–0.75)
ERYTHROCYTE [DISTWIDTH] IN BLOOD BY AUTOMATED COUNT: 42.3 FL (ref 35.9–50)
ERYTHROCYTE [SEDIMENTATION RATE] IN BLOOD BY WESTERGREN METHOD: 19 MM/HOUR (ref 0–20)
HCT VFR BLD AUTO: 42.7 % (ref 42–52)
HGB BLD-MCNC: 15.5 G/DL (ref 14–18)
INR PPP: 1.04 (ref 0.87–1.13)
LACTATE SERPL-SCNC: 1.1 MMOL/L (ref 0.5–2)
MCH RBC QN AUTO: 32 PG (ref 27–33)
MCHC RBC AUTO-ENTMCNC: 36.3 G/DL (ref 32.3–36.5)
MCV RBC AUTO: 88 FL (ref 81.4–97.8)
PLATELET # BLD AUTO: 458 K/UL (ref 164–446)
PMV BLD AUTO: 10 FL (ref 9–12.9)
PROTHROMBIN TIME: 13.7 SEC (ref 12–14.6)
RBC # BLD AUTO: 4.85 M/UL (ref 4.7–6.1)
SCCMEC + MECA PNL NOSE NAA+PROBE: NEGATIVE
WBC # BLD AUTO: 9.1 K/UL (ref 4.8–10.8)

## 2024-08-06 PROCEDURE — 85652 RBC SED RATE AUTOMATED: CPT

## 2024-08-06 PROCEDURE — 85027 COMPLETE CBC AUTOMATED: CPT

## 2024-08-06 PROCEDURE — 87641 MR-STAPH DNA AMP PROBE: CPT

## 2024-08-06 PROCEDURE — 36415 COLL VENOUS BLD VENIPUNCTURE: CPT

## 2024-08-06 PROCEDURE — G0378 HOSPITAL OBSERVATION PER HR: HCPCS

## 2024-08-06 PROCEDURE — 700105 HCHG RX REV CODE 258: Performed by: STUDENT IN AN ORGANIZED HEALTH CARE EDUCATION/TRAINING PROGRAM

## 2024-08-06 PROCEDURE — 99239 HOSP IP/OBS DSCHRG MGMT >30: CPT | Performed by: GENERAL PRACTICE

## 2024-08-06 PROCEDURE — 86140 C-REACTIVE PROTEIN: CPT

## 2024-08-06 RX ORDER — OXYCODONE HYDROCHLORIDE 5 MG/1
2.5 TABLET ORAL
Status: DISCONTINUED | OUTPATIENT
Start: 2024-08-06 | End: 2024-08-06 | Stop reason: HOSPADM

## 2024-08-06 RX ORDER — OXYCODONE HYDROCHLORIDE 5 MG/1
5 TABLET ORAL
Status: DISCONTINUED | OUTPATIENT
Start: 2024-08-06 | End: 2024-08-06 | Stop reason: HOSPADM

## 2024-08-06 RX ORDER — MORPHINE SULFATE 4 MG/ML
2 INJECTION INTRAVENOUS
Status: DISCONTINUED | OUTPATIENT
Start: 2024-08-06 | End: 2024-08-06 | Stop reason: HOSPADM

## 2024-08-06 RX ADMIN — SODIUM CHLORIDE, POTASSIUM CHLORIDE, SODIUM LACTATE AND CALCIUM CHLORIDE: 600; 310; 30; 20 INJECTION, SOLUTION INTRAVENOUS at 01:07

## 2024-08-06 ASSESSMENT — ENCOUNTER SYMPTOMS
DOUBLE VISION: 0
DEPRESSION: 0
FEVER: 0
ABDOMINAL PAIN: 0
BRUISES/BLEEDS EASILY: 0
FOCAL WEAKNESS: 0
PALPITATIONS: 0
CHILLS: 0
BLURRED VISION: 0
SPEECH CHANGE: 0
VOMITING: 0
NAUSEA: 0
DIZZINESS: 0
COUGH: 0
MYALGIAS: 1
SHORTNESS OF BREATH: 1
HEARTBURN: 0
HEADACHES: 0

## 2024-08-06 ASSESSMENT — LIFESTYLE VARIABLES
CONSUMPTION TOTAL: POSITIVE
DOES PATIENT WANT TO STOP DRINKING: NO
ALCOHOL_USE: YES
EVER FELT BAD OR GUILTY ABOUT YOUR DRINKING: NO
HAVE PEOPLE ANNOYED YOU BY CRITICIZING YOUR DRINKING: NO
HAVE YOU EVER FELT YOU SHOULD CUT DOWN ON YOUR DRINKING: NO
ON A TYPICAL DAY WHEN YOU DRINK ALCOHOL HOW MANY DRINKS DO YOU HAVE: 1
HOW MANY TIMES IN THE PAST YEAR HAVE YOU HAD 5 OR MORE DRINKS IN A DAY: 1
TOTAL SCORE: 0
TOTAL SCORE: 0
SUBSTANCE_ABUSE: 0
AVERAGE NUMBER OF DAYS PER WEEK YOU HAVE A DRINK CONTAINING ALCOHOL: 1
EVER HAD A DRINK FIRST THING IN THE MORNING TO STEADY YOUR NERVES TO GET RID OF A HANGOVER: NO
TOTAL SCORE: 0

## 2024-08-06 ASSESSMENT — PATIENT HEALTH QUESTIONNAIRE - PHQ9
SUM OF ALL RESPONSES TO PHQ9 QUESTIONS 1 AND 2: 0
2. FEELING DOWN, DEPRESSED, IRRITABLE, OR HOPELESS: NOT AT ALL
1. LITTLE INTEREST OR PLEASURE IN DOING THINGS: NOT AT ALL

## 2024-08-06 ASSESSMENT — COGNITIVE AND FUNCTIONAL STATUS - GENERAL
SUGGESTED CMS G CODE MODIFIER DAILY ACTIVITY: CH
DAILY ACTIVITIY SCORE: 24
SUGGESTED CMS G CODE MODIFIER MOBILITY: CH
MOBILITY SCORE: 24

## 2024-08-06 ASSESSMENT — PAIN DESCRIPTION - PAIN TYPE
TYPE: ACUTE PAIN
TYPE: ACUTE PAIN

## 2024-08-06 NOTE — PROGRESS NOTES
Received patient from ED alert and oriented x4. On RA. Denies pain at this time. Admission profile and skin check completed. Fall precautions in place and call light within reach. All other needs met at this time.

## 2024-08-06 NOTE — ED NOTES
Med rec updated and complete allergies reviewed.   Pt confirmed name and date of birth    Pt denies taking medications.  No prescription, no OTC and no supplements.        Home pharmacy  [facility] = [phone]

## 2024-08-06 NOTE — DISCHARGE SUMMARY
Discharge Summary    CHIEF COMPLAINT ON ADMISSION  Chief Complaint   Patient presents with    Foot Pain     Patient reports left foot soreness and swelling x 1 month. The pain became worse over the last two weeks. Patient denies injury. Patient was seen at [facility] today for an xray .     Sent by MD     Patient sent by s [facility] doctor for possible osteomyelitis.        Reason for Admission  Left Foot Pain/Sent by MD     Admission Date  8/5/2024    CODE STATUS  Full Code    HPI & HOSPITAL COURSE  This is a 59-year-old male with past medical history of childhood hereditary spherocytosis s/p splenectomy who was sent into the ER due to concerns of possible osteomyelitis.    Patient seen by sports medicine provider, he suffered injury to lateral left foot.  X-ray imaging noted focal bony reabsorption of the lateral margin of the fifth metatarsal head with overlying swelling concern for inflammation versus infection.  MRI imaging noted fifth metatarsal head bone marrow edema extending into the distal diaphysis, osteomyelitis versus erosion with bone marrow edema.  ESR 23 > 19, CRP negative, procalcitonin negative, MRSA swab negative.    Patient afebrile, mild leukocytosis at 12.2, resolved without any antibiotic regimen patient's pain and swelling resolved.  Patient with no history of diabetes or neuropathy, no trauma, open wound.  Case was discussed and reviewed by orthopedic surgery, agree with assessment that this is not osteomyelitis.  Patient provided with referrals to follow-up with podiatry or orthopedic surgery outpatient.    Therefore, he is discharged in good and stable condition to home with close outpatient follow-up.    The patient recovered much more quickly than anticipated on admission.    Discharge Date  8/6/2024    FOLLOW UP ITEMS POST DISCHARGE  Primary care physician  Orthopedic surgery versus podiatry    DISCHARGE DIAGNOSES  Principal Problem:    Osteomyelitis (HCC) (POA: Yes)  Active Problems:     History of splenectomy (POA: Yes)    Leukocytosis (POA: Unknown)  Resolved Problems:    * No resolved hospital problems. *      FOLLOW UP  Future Appointments   Date Time Provider Department Center   8/12/2024  2:00 PM [facility] [address]   9/27/2024  1:20 PM Melvin Potter M.D. [facility] Potter M.D.  [address] [id number] [phone]    Follow up      Juan Ceballos M.D.  [address] [place] [id number] [phone]    Follow up        MEDICATIONS ON DISCHARGE     Medication List      You have not been prescribed any medications.         Allergies  No Known Allergies    DIET  Orders Placed This Encounter   Procedures    Diet Order Diet: Regular     Standing Status:   Standing     Number of Occurrences:   1     Order Specific Question:   Diet:     Answer:   Regular [1]       ACTIVITY  As tolerated.  Weight bearing as tolerated    CONSULTATIONS  Orthopedic     PROCEDURES  None    LABORATORY  Lab Results   Component Value Date    SODIUM 139 08/05/2024    POTASSIUM 3.7 08/05/2024    CHLORIDE 102 08/05/2024    CO2 23 08/05/2024    GLUCOSE 110 (H) 08/05/2024    BUN 18 08/05/2024    CREATININE 0.62 08/05/2024        Lab Results   Component Value Date    WBC 9.1 08/06/2024    HEMOGLOBIN 15.5 08/06/2024    HEMATOCRIT 42.7 08/06/2024    PLATELETCT 458 (H) 08/06/2024      MR-FOOT-W/O LEFT   Final Result      1.  5th metatarsal head bone marrow edema extending into the distal diaphysis      2.  Differential diagnosis is of osteomyelitis versus an erosion with bone marrow edema      3.  No other finding         Total time of the discharge process exceeds 45 minutes.

## 2024-08-06 NOTE — HOSPITAL COURSE
This is a 59-year-old male with past medical history of childhood hereditary spherocytosis s/p splenectomy who was sent into the ER due to concerns of possible osteomyelitis.    Patient seen by sports medicine provider, he suffered injury to lateral left foot.  X-ray imaging noted focal bony reabsorption of the lateral margin of the fifth metatarsal head with overlying swelling concern for inflammation versus infection.  MRI imaging noted fifth metatarsal head bone marrow edema extending into the distal diaphysis, osteomyelitis versus erosion with bone marrow edema.  ESR 23 > 19, CRP negative, procalcitonin negative, MRSA swab negative.    Patient afebrile, mild leukocytosis at 12.2, resolved without any antibiotic regimen patient's pain and swelling resolved.  Patient with no history of diabetes or neuropathy, no trauma, open wound.  Case was discussed and reviewed by orthopedic surgery, agree with assessment that this is not osteomyelitis.  Patient provided with referrals to follow-up with podiatry or orthopedic surgery outpatient.

## 2024-08-06 NOTE — H&P
Hospital Medicine History & Physical Note    Date of Service  8/5/2024    Primary Care Physician  Melvin Potter M.D.    Consultants  None    Code Status  Full Code    Chief Complaint  Chief Complaint   Patient presents with    Foot Pain     Patient reports left foot soreness and swelling x 1 month. The pain became worse over the last two weeks. Patient denies injury. Patient was seen at [facility] today for an xray .     Sent by MD     Patient sent by [facility] medicine doctor for possible osteomyelitis.        History of Presenting Illness  Darrius Velasquez is a 59 y.o. male with history of childhood hereditary spherocytosis s/p splenectomy who presented 8/5/2024 to ER at the referral of sports medicine provider for evaluation of suspected left lateral foot osteomyelitis.  Patient reported to have suffered injury to lateral left foot.  He has been followed at outpatient sports medicine clinic.  X-ray completed outpatient earlier, ultimately, referred to ER to rule out osteomyelitis with MRI.  He has no underlying diabetes or tobacco abuse.  Admission requested by ERP.  Therefore, admitted to medicine service for further evaluation and treatment    I discussed the plan of care with patient, bedside RN, and pharmacy.    Review of Systems  Review of Systems   Constitutional:  Negative for chills, fever and malaise/fatigue.   HENT:  Negative for hearing loss and tinnitus.    Eyes:  Negative for blurred vision and double vision.   Respiratory:  Positive for shortness of breath. Negative for cough.    Cardiovascular:  Negative for chest pain and palpitations.   Gastrointestinal:  Negative for abdominal pain, heartburn, nausea and vomiting.   Genitourinary:  Negative for dysuria and urgency.   Musculoskeletal:  Positive for joint pain and myalgias.   Skin:  Negative for itching and rash.   Neurological:  Negative for dizziness, speech change, focal weakness and headaches.   Endo/Heme/Allergies:  Negative for  environmental allergies. Does not bruise/bleed easily.   Psychiatric/Behavioral:  Negative for depression and substance abuse.    All other systems reviewed and are negative.      Past Medical History   has no past medical history on file.    Surgical History   has a past surgical history that includes appendectomy; splenectomy; cholecystectomy; inguinal hernia repair (Right); meniscectomy (Right); and splenectomy.     Family History  family history includes Blood Disease in his brother, brother, brother, and father; Cancer in his maternal grandmother; Heart Disease in his father; Hyperlipidemia in his father; Hypertension in his father; No Known Problems in his daughter, mother, and son; Psychiatric Illness in his brother; Stroke in his father.   Family history reviewed with patient. There is family history that is pertinent to the chief complaint.     Social History   reports that he has never smoked. He has never used smokeless tobacco. He reports current alcohol use. He reports that he does not use drugs.    Allergies  No Known Allergies    Medications  None       Physical Exam  Temp:  [36.2 °C (97.1 °F)-36.3 °C (97.4 °F)] 36.2 °C (97.1 °F)  Pulse:  [58-70] 58  Resp:  [16] 16  BP: (107-151)/(68-92) 134/81  SpO2:  [90 %-95 %] 95 %  Blood Pressure: 139/83   Temperature: 36.2 °C (97.1 °F)   Pulse: 70   Respiration: 16   Pulse Oximetry: 95 %       Physical Exam  Vitals and nursing note reviewed.   Constitutional:       General: He is not in acute distress.  HENT:      Head: Normocephalic and atraumatic.      Nose: Nose normal.      Mouth/Throat:      Mouth: Mucous membranes are moist.      Pharynx: Oropharynx is clear.   Eyes:      General: No scleral icterus.     Extraocular Movements: Extraocular movements intact.   Cardiovascular:      Rate and Rhythm: Normal rate and regular rhythm.      Pulses: Normal pulses.      Heart sounds: Normal heart sounds.   Pulmonary:      Effort: No respiratory distress.      Breath  "sounds: No stridor. No wheezing or rales.   Chest:      Chest wall: No tenderness.   Abdominal:      General: There is no distension.      Tenderness: There is no abdominal tenderness. There is no guarding or rebound.   Musculoskeletal:         General: Normal range of motion.      Cervical back: Neck supple. No tenderness.      Right lower leg: No edema.      Comments: Left foot-swollen at lateral left fifth toe, no erythema or appreciable warmth   Skin:     General: Skin is warm and dry.      Capillary Refill: Capillary refill takes less than 2 seconds.   Neurological:      General: No focal deficit present.      Mental Status: He is alert and oriented to person, place, and time.   Psychiatric:         Mood and Affect: Mood normal.         Laboratory:  Recent Labs     08/05/24 2113   WBC 12.2*   RBC 4.80   HEMOGLOBIN 15.3   HEMATOCRIT 41.3*   MCV 86.0   MCH 31.9   MCHC 37.0*   RDW 41.0   PLATELETCT 452*   MPV 9.9     Recent Labs     08/05/24 2113   SODIUM 139   POTASSIUM 3.7   CHLORIDE 102   CO2 23   GLUCOSE 110*   BUN 18   CREATININE 0.62   CALCIUM 9.4     Recent Labs     08/05/24 2113   ALTSGPT 35   ASTSGOT 32   ALKPHOSPHAT 104*   TBILIRUBIN 1.7*   GLUCOSE 110*     Recent Labs     08/05/24  2332   INR 1.04     No results for input(s): \"NTPROBNP\" in the last 72 hours.      No results for input(s): \"TROPONINT\" in the last 72 hours.    Imaging:  MR-FOOT-W/O LEFT    (Results Pending)       X-Ray:  I have personally reviewed the images and compared with prior images.    Assessment/Plan:  Justification for Admission Status  I anticipate this patient will require at least 2 months hospitalization, therefore appropriate for inpatient status.        * Osteomyelitis (HCC)- (present on admission)  Assessment & Plan  Of left foot, lateral to fifth toe  Doubt osteomyelitis, but will rule out  Defer antibiotic at this time, clinically not in septic state    MRI left foot completed, official radiology read pending  May " consider orthopedic/ID of MRI foot) osteomyelitis    Leukocytosis  Assessment & Plan  IVF  Repeat lab in AM    History of splenectomy- (present on admission)  Assessment & Plan  He stated he is up-to-date with postsplenectomy vaccinations and        VTE prophylaxis: SCDs/TEDs

## 2024-08-06 NOTE — ED NOTES
Pt transferred to S603/02 by Curt Ayala. Care relinquished. MRI completed prior to transfer. Continue care to S6.

## 2024-08-06 NOTE — ED NOTES
PT ambulated to room from ED lobby to GRN 28 without assistant. PT placed on monitor. Chart up for next available ERP.

## 2024-08-06 NOTE — DISCHARGE PLANNING
Patient rolled back to observation/outpatient status per attending   MD determination (Dr. Faye) and UR committee MD secondary review   (Dr. Mao). Status update completed.

## 2024-08-06 NOTE — PROGRESS NOTES
4 Eyes Skin Assessment Completed by GARCIA Krause and GARCIA Rutherford.    Head WDL  Ears WDL  Nose WDL  Mouth WDL  Neck WDL  Breast/Chest Redness and Bruising  Shoulder Blades WDL  Spine Redness and Blanching  (R) Arm/Elbow/Hand Redness and Blanching  (L) Arm/Elbow/Hand Redness and Blanching  Abdomen Scar  Groin WDL  Scrotum/Coccyx/Buttocks WDL  (R) Leg WDL  (L) Leg Redness  (R) Heel/Foot/Toe WDL  (L) Heel/Foot/Toe Redness and Swelling    Interventions In Place N/A    Possible Skin Injury No    Pictures Uploaded Into Epic Yes  Wound Consult Placed N/A  RN Wound Prevention Protocol Ordered No

## 2024-08-06 NOTE — ED TRIAGE NOTES
Darrius Parisna  59 y.o. male  Chief Complaint   Patient presents with    Foot Pain     Patient reports left foot soreness and swelling x 1 month. The pain became worse over the last two weeks. Patient denies injury. Patient was seen at [facility] today for an xray .     Sent by MD     Patient sent by s [facility] medicine doctor for possible osteomyelitis.       Patient does not have a spleen.     Vitals:    08/05/24 1843   BP: 139/83   Pulse: 70   Resp: 16   Temp: 36.2 °C (97.1 °F)   SpO2: 95%       Triage process explained to patient, apologized for wait time, and returned to lobby.  Pt informed to notify staff of any change in condition.

## 2024-08-06 NOTE — ED PROVIDER NOTES
ED Provider Note        CHIEF COMPLAINT  Chief Complaint   Patient presents with    Foot Pain     Patient reports left foot soreness and swelling x 1 month. The pain became worse over the last two weeks. Patient denies injury. Patient was seen at [facility] today for an xray .     Sent by MD     Patient sent by s [facility] medicine doctor for possible osteomyelitis.          HPI      Darrius Velasquez is a 59 y.o. male who presents to the Emergency Department with ongoing pain to his left lateral foot.  Has been having pain for over a month but over the past several days he has been having some worsening pain.  He was seen at sports medicine clinic today, had an x-ray performed, and was contacted after the radiologist expressed concern for possible osteomyelitis.  He denies any fevers, leg swelling, chest pain, shortness of breath    REVIEW OF SYSTEMS  See HPI for further details. All other systems are negative.     PAST MEDICAL HISTORY   History reviewed. No pertinent past medical history.    SURGICAL HISTORY  Past Surgical History:   Procedure Laterality Date    APPENDECTOMY      CHOLECYSTECTOMY      INGUINAL HERNIA REPAIR Right     MENISCECTOMY Right     SPLENECTOMY      SPLENECTOMY         FAMILY HISTORY  Family History   Problem Relation Age of Onset    No Known Problems Mother     Hyperlipidemia Father     Hypertension Father     Stroke Father     Heart Disease Father         CHF    Blood Disease Father         spherocytosis    Psychiatric Illness Brother         depression    Blood Disease Brother     Blood Disease Brother     Blood Disease Brother     Cancer Maternal Grandmother         pancreatic cancer in her 80s    No Known Problems Daughter     No Known Problems Son     Ovarian Cancer Neg Hx     Tubal Cancer Neg Hx     Peritoneal Cancer Neg Hx     Colorectal Cancer Neg Hx     Breast Cancer Neg Hx     Bilateral Breast Cancer Neg Hx     BRCA 1 Neg Hx     BRCA 1 Carrier  Neg Hx     BRCA 2 Neg Hx     BRCA 2 Carrier  Neg Hx        SOCIAL HISTORY    reports that he has never smoked. He has never used smokeless tobacco. He reports current alcohol use. He reports that he does not use drugs.    CURRENT MEDICATIONS  Home Medications       Reviewed by Carlie Gonzalez (Pharmacy Tech) on 08/05/24 at 4345  Med List Status: Complete     Medication Last Dose Status        Patient Len Taking any Medications                         Audit from Redirected Encounters    **Home medications have not yet been reviewed for this encounter**         ALLERGIES  No Known Allergies    PHYSICAL EXAM  VITAL SIGNS: /83   Pulse 70   Temp 36.2 °C (97.1 °F) (Temporal)   Resp 16   Wt 83.6 kg (184 lb 4.9 oz)   SpO2 95%   BMI 27.22 kg/m²   Gen: Alert, no acute distress  HEENT: ATNC  Eyes: PERRL, EOMI, normal conjunctiva  Neck: trachea midline  Resp: no respiratory distress  CV: No JVD  Abd: non-distended  Ext: No deformities.  2+ dorsalis pedis pulses bilaterally.  Erythema and tenderness over left fifth metatarsal  Psych: normal mood  Neuro: speech fluent    DIAGNOSTIC STUDIES / PROCEDURES    LABS  Labs Reviewed   CBC WITH DIFFERENTIAL - Abnormal; Notable for the following components:       Result Value    WBC 12.2 (*)     Hematocrit 41.3 (*)     MCHC 37.0 (*)     Platelet Count 452 (*)     Monos (Absolute) 1.47 (*)     All other components within normal limits   COMP METABOLIC PANEL - Abnormal; Notable for the following components:    Glucose 110 (*)     Alkaline Phosphatase 104 (*)     Total Bilirubin 1.7 (*)     Globulin 3.6 (*)     All other components within normal limits   CRP QUANTITIVE (NON-CARDIAC)   ESTIMATED GFR   LACTIC ACID   PROTHROMBIN TIME   URINALYSIS   PROCALCITONIN   CORTISOL   CULTURE WOUND W/ GRAM STAIN   SED RATE   LDH         RADIOLOGY      MR-FOOT-W/O LEFT    (Results Pending)       COURSE & MEDICAL DECISION MAKING  Pertinent Labs & Imaging studies were reviewed. (See chart for details)      EXTERNAL RECORDS  REVIEWED  Outpatient Notes patient seen today at the [facility] sports medicine clinic.  He had an x-ray performed which was concerning for possible osteomyelitis and is referred for further evaluation.      INITIAL ASSESSMENT AND PLAN  Care Narrative: Patient who is asplenic presents with ongoing pain to the left lateral foot.  X-ray performed today concerning for potential osteomyelitis.  Patient denies any fevers.  He does have a history of hereditary spherocytosis, and is asplenic, increasing his risk for infection, including Pseudomonas.  He does have slight leukocytosis but no elevated CRP.  Given his asplenia and high risk for infection, will plan for hospitalization for MRI.    ADDITIONAL PROBLEM LIST AND DISPOSITION    I have discussed management of the patient with the following physicians and SHAHEEN's: See below      Decision tools and prescription drugs considered including, but not limited to: Antibiotics will defer to ensure appropriate culture .            FINAL IMPRESSION  1. Osteomyelitis of left foot, unspecified type (HCC)    2. Asplenia             Case discussed with Dr. Cole, who will evaluate the patient for hospitalization. Patient will be hospitalized in guarded condition.    This dictation was created using voice recognition software. The accuracy of the dictation is limited to the abilities of the software. I expect there may be some errors of grammar and possibly content. The nursing notes were reviewed and certain aspects of this information were incorporated into this note.

## 2024-08-06 NOTE — ASSESSMENT & PLAN NOTE
Of left foot, lateral to fifth toe  Doubt osteomyelitis, but will rule out  Defer antibiotic at this time, clinically not in septic state    MRI left foot completed, official radiology read pending  May consider orthopedic/ID of MRI foot) osteomyelitis

## 2024-08-06 NOTE — CARE PLAN
The patient is Stable - Low risk of patient condition declining or worsening    Shift Goals  Clinical Goals: MRI   Patient Goals: rest  Family Goals: jacki    Progress made toward(s) clinical / shift goals: updated patient on POC and verbalized understanding. Hourly rounds performed. Needs attended.      Patient is not progressing towards the following goals:    Problem: Knowledge Deficit - Standard  Goal: Patient and family/care givers will demonstrate understanding of plan of care, disease process/condition, diagnostic tests and medications  Description: Target End Date:  1-3 days or as soon as patient condition allows    Document in Patient Education    1.  Patient and family/caregiver oriented to unit, equipment, visitation policy and means for communicating concern  2.  Complete/review Learning Assessment  3.  Assess knowledge level of disease process/condition, treatment plan, diagnostic tests and medications  4.  Explain disease process/condition, treatment plan, diagnostic tests and medications  Outcome: Not Progressing

## 2024-08-12 ENCOUNTER — OFFICE VISIT (OUTPATIENT)
Dept: SPORTS MEDICINE | Facility: OTHER | Age: 59
End: 2024-08-12
Payer: COMMERCIAL

## 2024-08-12 VITALS
SYSTOLIC BLOOD PRESSURE: 126 MMHG | DIASTOLIC BLOOD PRESSURE: 80 MMHG | WEIGHT: 184.31 LBS | OXYGEN SATURATION: 95 % | HEART RATE: 71 BPM | HEIGHT: 69 IN | BODY MASS INDEX: 27.3 KG/M2 | RESPIRATION RATE: 16 BRPM | TEMPERATURE: 98.4 F

## 2024-08-12 DIAGNOSIS — M79.605 PAIN OF LEFT LOWER EXTREMITY: ICD-10-CM

## 2024-08-12 DIAGNOSIS — M85.80 EROSION OF BONE: ICD-10-CM

## 2024-08-12 PROCEDURE — 3074F SYST BP LT 130 MM HG: CPT | Performed by: FAMILY MEDICINE

## 2024-08-12 PROCEDURE — 3079F DIAST BP 80-89 MM HG: CPT | Performed by: FAMILY MEDICINE

## 2024-08-12 PROCEDURE — 99213 OFFICE O/P EST LOW 20 MIN: CPT | Performed by: FAMILY MEDICINE

## 2024-08-12 ASSESSMENT — FIBROSIS 4 INDEX: FIB4 SCORE: 0.71

## 2024-08-12 NOTE — PROGRESS NOTES
"Chief Complaint   Patient presents with    Foot Pain     L foot pain      Subjective     Self-Referred for evaluation of LEFT foot pain  Insidious onset, late 2024  He had been sitting on that side on top of the foot for extended period of time  He ran 2 to 4 miles on the treadmill a few days per week after that  Initially his pain was mild, but symptoms worsened after running  Over the past 2 weeks symptoms have been progressively worsening  Worse with weightbearing and ambulation  Improved with rest  POSITIVE for night symptoms which causes difficulty sleeping  ibuprofen helps minimally  Icing has helped swelling is present, but symptoms can worsen if he is on his feet for extended periods of time     Update:  Fortunately, tall cam walker boot has HELPED  He is experiencing LESS pain  Does not have a known history of RA in the family  His father  at age 90, mother is still alive at 85    Works at the [employer], desk work  Likes to run, a few miles during the week  Before the summer and pain had been running long 10 miles or so on trails on weekends  Likes swimming, lifting weights    Objective   /80 (BP Location: Left arm, Patient Position: Sitting, BP Cuff Size: Adult)   Pulse 71   Temp 36.9 °C (98.4 °F) (Temporal)   Resp 16   Ht 1.753 m (5' 9\")   Wt 83.6 kg (184 lb 4.9 oz)   SpO2 95%   BMI 27.22 kg/m²     LEFT FOOT:  There is POSITIVE swelling noted at the foot  There is NO tenderness at the base of the fifth metatarsal, cuboid, or tarsal navicular  There is LESS tenderness at the distal shaft of the LEFT fifth metatarsal with particular point tenderness at the fifth metatarsal head   There is POSITIVE pain with metatarsal squeeze test at the fifth metatarsal head region    NEUTRAL stance  Able to ambulate with cam walker    1. Pain of left lower extremity  RHEUMATOID ARTHRITIS FACTOR    URIC ACID      2. Erosion of bone  RHEUMATOID ARTHRITIS FACTOR    URIC ACID    DS-BONE " DENSITY STUDY (DEXA)        Insidious onset, late June 2024  He had been sitting on that side on top of the foot for extended period of time  He ran 2 to 4 miles on the treadmill a few days per week after that    Patient's only risk factor is history of splenectomy  To my knowledge, this does not increase his risk of osteomyelitis    CONTINUE tall cam walker boot (SEEMS TO BE HELPING)    Check uric acid, check rheumatoid factor labs and check bone density    Return in about 3 weeks (around 9/2/2024).  He is improving in CAM Walker boot  To see how he is doing after 3 weeks and CAM Walker boot    8/6/24  WBC  4.8 - 10.8 K/uL 9.1     Stat C-Reactive Protein  0.00 - 0.75 mg/dL <0.30     Sed Rate Westergren  0 - 20 mm/hour 19     LDH Total  107 - 266 U/L 380 High      MRSA by PCR  Negative  Negative         8/5/2024 10:56 PM     HISTORY/REASON FOR EXAM:  LEFT FOOT PAIN, concern for osteomyelitis with abnormal x-ray        TECHNIQUE/EXAM DESCRIPTION:  MRI of the LEFT foot without contrast.     The study was performed on a [employer] Sign[employer] 1.5 Leeanne MRI scanner. T1 axial and sagittal, fast spin-echo T2 fat-suppressed axial and coronal, and fast inversion recovery sagittal images were obtained.     COMPARISON:  Left foot x-ray 8/5/2024     FINDINGS:  BONE AND MARROW: There is low T1 signal and high T2 in the 5th metatarsal head. This could be osteomyelitis or secondary to an erosion.     As osteoarthritis the midfoot.     MUSCLES: The muscular compartment is normal in appearance.     LIGAMENTS and TENDONS: The visualized ligament and tendon are normal in appearance.     MISCELLANEOUS: No fluid collection or mass.           IMPRESSION:     1.  5th metatarsal head bone marrow edema extending into the distal diaphysis     2.  Differential diagnosis is of osteomyelitis versus an erosion with bone marrow edema     3.  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     COMPARISON:  None.     FINDINGS:  No fracture or dislocation.  No periosteal new bone formation.  Rarefaction is seen at the lateral margin of 5th metatarsal head with overlying soft tissue swelling.     IMPRESSION:     1.  No fracture or dislocation of LEFT foot.  2.  Focal bony resorption at the lateral margin 5th metatarsal head with overlying swelling concerning for inflammation/infection.           Exam Ended: 08/05/24  4:46 PM Last Resulted: 08/05/24  5:06 PM

## 2024-08-15 ENCOUNTER — HOSPITAL ENCOUNTER (OUTPATIENT)
Dept: LAB | Facility: MEDICAL CENTER | Age: 59
End: 2024-08-15
Attending: FAMILY MEDICINE
Payer: COMMERCIAL

## 2024-08-15 DIAGNOSIS — M79.605 PAIN OF LEFT LOWER EXTREMITY: ICD-10-CM

## 2024-08-15 DIAGNOSIS — M85.80 EROSION OF BONE: ICD-10-CM

## 2024-08-15 LAB
RHEUMATOID FACT SER IA-ACNC: <10 IU/ML (ref 0–14)
URATE SERPL-MCNC: 7.6 MG/DL (ref 2.5–8.3)

## 2024-08-15 PROCEDURE — 86431 RHEUMATOID FACTOR QUANT: CPT

## 2024-08-15 PROCEDURE — 36415 COLL VENOUS BLD VENIPUNCTURE: CPT

## 2024-08-15 PROCEDURE — 84550 ASSAY OF BLOOD/URIC ACID: CPT

## 2024-08-26 ENCOUNTER — OFFICE VISIT (OUTPATIENT)
Dept: SPORTS MEDICINE | Facility: OTHER | Age: 59
End: 2024-08-26
Payer: COMMERCIAL

## 2024-08-26 VITALS
DIASTOLIC BLOOD PRESSURE: 76 MMHG | SYSTOLIC BLOOD PRESSURE: 120 MMHG | RESPIRATION RATE: 16 BRPM | TEMPERATURE: 97.1 F | OXYGEN SATURATION: 97 % | HEART RATE: 72 BPM | BODY MASS INDEX: 27.3 KG/M2 | WEIGHT: 184.31 LBS | HEIGHT: 69 IN

## 2024-08-26 DIAGNOSIS — M79.605 PAIN OF LEFT LOWER EXTREMITY: ICD-10-CM

## 2024-08-26 DIAGNOSIS — M85.80 EROSION OF BONE: ICD-10-CM

## 2024-08-26 PROCEDURE — 3074F SYST BP LT 130 MM HG: CPT | Performed by: FAMILY MEDICINE

## 2024-08-26 PROCEDURE — 99213 OFFICE O/P EST LOW 20 MIN: CPT | Performed by: FAMILY MEDICINE

## 2024-08-26 PROCEDURE — 3078F DIAST BP <80 MM HG: CPT | Performed by: FAMILY MEDICINE

## 2024-08-26 ASSESSMENT — FIBROSIS 4 INDEX: FIB4 SCORE: 0.71

## 2024-08-26 NOTE — PROGRESS NOTES
"Chief Complaint   Patient presents with    Foot Pain     L foot pain      Subjective     Self-Referred for evaluation of LEFT foot pain  Insidious onset, late June 2024  He had been sitting on that side on top of the foot for extended period of time  He ran 2 to 4 miles on the treadmill a few days per week after that  Initially his pain was mild, but symptoms worsened after running  Over the past 2 weeks symptoms have been progressively worsening  Worse with weightbearing and ambulation  Improved with rest  POSITIVE for night symptoms which causes difficulty sleeping  ibuprofen helps minimally  Icing has helped swelling is present, but symptoms can worsen if he is on his feet for extended periods of time     Update:  Fortunately, tall cam walker boot has HELPED  About %70  improved at this point  Has done some walking  without boot with some pain    Works at the [employer], desk work  Likes to run, a few miles during the week  Before the summer and pain had been running long 10 miles or so on trails on weekends  Likes swimming, lifting weights    Objective   /76 (BP Location: Left arm, Patient Position: Sitting, BP Cuff Size: Adult)   Pulse 72   Temp 36.2 °C (97.1 °F) (Temporal)   Resp 16   Ht 1.753 m (5' 9\")   Wt 83.6 kg (184 lb 4.9 oz)   SpO2 97%   BMI 27.22 kg/m²       LEFT FOOT:  There is POSITIVE swelling noted at the foot  There is NO tenderness at the base of the fifth metatarsal, cuboid, or tarsal navicular  There is MINIMAL tenderness at the distal shaft of the LEFT fifth metatarsal with particular point tenderness at the fifth metatarsal head   There is MINIMAL pain with metatarsal squeeze test at the fifth metatarsal head region    NEUTRAL stance  Able to ambulate with cam walker    1. Pain of left lower extremity        2. Erosion of bone          Insidious onset, late June 2024  He had been sitting on that side on top of the foot for extended period of time  He ran 2 to 4 " miles on the treadmill a few days per week after that    Patient's only risk factor is history of splenectomy  To my knowledge, this does not increase his risk of osteomyelitis    CONTINUE tall cam walker boot (SEEMS TO BE HELPING)    uric acid AND rheumatoid factor labs were NEGATIVE    He is improving in CAM Walker boot  Recommend CAM Walker boot for an additional week (until September 2, 2024)  In follow-up the following week to see how he is doing out of the cam walker boot    8/6/24  WBC  4.8 - 10.8 K/uL 9.1     Stat C-Reactive Protein  0.00 - 0.75 mg/dL <0.30     Sed Rate Westergren  0 - 20 mm/hour 19     LDH Total  107 - 266 U/L 380 High      MRSA by PCR  Negative  Negative         8/5/2024 10:56 PM     HISTORY/REASON FOR EXAM:  LEFT FOOT PAIN, concern for osteomyelitis with abnormal x-ray        TECHNIQUE/EXAM DESCRIPTION:  MRI of the LEFT foot without contrast.     The study was performed on a [employer] Signa 1.5 Leeanne MRI scanner. T1 axial and sagittal, fast spin-echo T2 fat-suppressed axial and coronal, and fast inversion recovery sagittal images were obtained.     COMPARISON:  Left foot x-ray 8/5/2024     FINDINGS:  BONE AND MARROW: There is low T1 signal and high T2 in the 5th metatarsal head. This could be osteomyelitis or secondary to an erosion.     As osteoarthritis the midfoot.     MUSCLES: The muscular compartment is normal in appearance.     LIGAMENTS and TENDONS: The visualized ligament and tendon are normal in appearance.     MISCELLANEOUS: No fluid collection or mass.           IMPRESSION:     1.  5th metatarsal head bone marrow edema extending into the distal diaphysis     2.  Differential diagnosis is of osteomyelitis versus an erosion with bone marrow edema     3.  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     COMPARISON:  None.     FINDINGS:  No fracture or dislocation.  No periosteal new bone formation.  Rarefaction is seen at the lateral margin of 5th metatarsal head with overlying soft tissue swelling.     IMPRESSION:     1.  No fracture or dislocation of LEFT foot.  2.  Focal bony resorption at the lateral margin 5th metatarsal head with overlying swelling concerning for inflammation/infection.           Exam Ended: 08/05/24  4:46 PM Last Resulted: 08/05/24  5:06 PM

## 2024-09-12 ENCOUNTER — OFFICE VISIT (OUTPATIENT)
Dept: SPORTS MEDICINE | Facility: OTHER | Age: 59
End: 2024-09-12
Payer: COMMERCIAL

## 2024-09-12 VITALS
RESPIRATION RATE: 18 BRPM | DIASTOLIC BLOOD PRESSURE: 80 MMHG | BODY MASS INDEX: 27.3 KG/M2 | SYSTOLIC BLOOD PRESSURE: 124 MMHG | WEIGHT: 184.31 LBS | TEMPERATURE: 98.7 F | HEART RATE: 66 BPM | HEIGHT: 69 IN | OXYGEN SATURATION: 93 %

## 2024-09-12 DIAGNOSIS — M79.605 PAIN OF LEFT LOWER EXTREMITY: ICD-10-CM

## 2024-09-12 DIAGNOSIS — M85.80 EROSION OF BONE: ICD-10-CM

## 2024-09-12 PROCEDURE — 3079F DIAST BP 80-89 MM HG: CPT | Performed by: FAMILY MEDICINE

## 2024-09-12 PROCEDURE — 3074F SYST BP LT 130 MM HG: CPT | Performed by: FAMILY MEDICINE

## 2024-09-12 PROCEDURE — 99213 OFFICE O/P EST LOW 20 MIN: CPT | Performed by: FAMILY MEDICINE

## 2024-09-12 ASSESSMENT — FIBROSIS 4 INDEX: FIB4 SCORE: 0.71

## 2024-09-12 NOTE — PROGRESS NOTES
"Chief Complaint   Patient presents with    Foot Pain     L foot pain      Subjective     Self-Referred for evaluation of LEFT foot pain  Insidious onset, late June 2024  He had been sitting on that side on top of the foot for extended period of time  He ran 2 to 4 miles on the treadmill a few days per week after that  Initially his pain was mild, but symptoms worsened after running  Over the past 2 weeks symptoms have been progressively worsening  Worse with weightbearing and ambulation  Improved with rest  POSITIVE for night symptoms which causes difficulty sleeping  ibuprofen helps minimally  Icing has helped swelling is present, but symptoms can worsen if he is on his feet for extended periods of time     Update:  Fortunately, tall cam walker boot has HELPED  He would like to get back to running  He has a 24 mile hike planned next month at the [employer] at the [employer], desk work  Likes to run, a few miles during the week  Before the summer and pain had been running long 10 miles or so on trails on weekends  Likes swimming, lifting weights    Objective   /80 (BP Location: Left arm, Patient Position: Sitting, BP Cuff Size: Adult)   Pulse 66   Temp 37.1 °C (98.7 °F) (Temporal)   Resp 18   Ht 1.753 m (5' 9\")   Wt 83.6 kg (184 lb 4.9 oz)   SpO2 93%   BMI 27.22 kg/m²     LEFT FOOT:  There is POSITIVE swelling noted at the foot  There is NO tenderness at the base of the fifth metatarsal, cuboid, or tarsal navicular  There is MINIMAL tenderness at the distal shaft of the LEFT fifth metatarsal with particular point tenderness at the fifth metatarsal head   There is MINIMAL pain with metatarsal squeeze test at the fifth metatarsal head region    NEUTRAL stance  Able to ambulate with cam walker    1. Pain of left lower extremity        2. Erosion of bone          Insidious onset, late June 2024  He had been sitting on that side on top of the foot for extended period of time  He ran " 2 to 4 miles on the treadmill a few days per week after that    He is doing well in CAM Walker boot  It has been nearly 6 weeks since he has been in the cam walker boot  Discontinue cam walker boot  Transition to functional ankle brace for walking/activity    He is pretty active, likes running  He will start light training on a treadmill  PENDING boned density study      8/6/24  WBC  4.8 - 10.8 K/uL 9.1     Stat C-Reactive Protein  0.00 - 0.75 mg/dL <0.30     Sed Rate Westergren  0 - 20 mm/hour 19     LDH Total  107 - 266 U/L 380 High      MRSA by PCR  Negative  Negative         8/5/2024 10:56 PM     HISTORY/REASON FOR EXAM:  LEFT FOOT PAIN, concern for osteomyelitis with abnormal x-ray        TECHNIQUE/EXAM DESCRIPTION:  MRI of the LEFT foot without contrast.     The study was performed on a [employer] Signa 1.5 Leeanne MRI scanner. T1 axial and sagittal, fast spin-echo T2 fat-suppressed axial and coronal, and fast inversion recovery sagittal images were obtained.     COMPARISON:  Left foot x-ray 8/5/2024     FINDINGS:  BONE AND MARROW: There is low T1 signal and high T2 in the 5th metatarsal head. This could be osteomyelitis or secondary to an erosion.     As osteoarthritis the midfoot.     MUSCLES: The muscular compartment is normal in appearance.     LIGAMENTS and TENDONS: The visualized ligament and tendon are normal in appearance.     MISCELLANEOUS: No fluid collection or mass.           IMPRESSION:     1.  5th metatarsal head bone marrow edema extending into the distal diaphysis     2.  Differential diagnosis is of osteomyelitis versus an erosion with bone marrow edema     3.  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     COMPARISON:  None.     FINDINGS:  No fracture or dislocation.  No  periosteal new bone formation.  Rarefaction is seen at the lateral margin of 5th metatarsal head with overlying soft tissue swelling.     IMPRESSION:     1.  No fracture or dislocation of LEFT foot.  2.  Focal bony resorption at the lateral margin 5th metatarsal head with overlying swelling concerning for inflammation/infection.           Exam Ended: 08/05/24  4:46 PM Last Resulted: 08/05/24  5:06 PM

## 2024-09-19 ENCOUNTER — HOSPITAL ENCOUNTER (OUTPATIENT)
Dept: RADIOLOGY | Facility: MEDICAL CENTER | Age: 59
End: 2024-09-19
Attending: FAMILY MEDICINE
Payer: COMMERCIAL

## 2024-09-19 DIAGNOSIS — M85.80 EROSION OF BONE: ICD-10-CM

## 2024-09-19 PROCEDURE — 77080 DXA BONE DENSITY AXIAL: CPT

## 2024-09-27 ENCOUNTER — OFFICE VISIT (OUTPATIENT)
Dept: MEDICAL GROUP | Facility: IMAGING CENTER | Age: 59
End: 2024-09-27
Payer: COMMERCIAL

## 2024-09-27 VITALS
TEMPERATURE: 98 F | WEIGHT: 184 LBS | OXYGEN SATURATION: 97 % | BODY MASS INDEX: 27.25 KG/M2 | HEART RATE: 68 BPM | HEIGHT: 69 IN

## 2024-09-27 DIAGNOSIS — S90.822A BLISTER OF LEFT FOOT, INITIAL ENCOUNTER: ICD-10-CM

## 2024-09-27 DIAGNOSIS — Z00.00 WELLNESS EXAMINATION: ICD-10-CM

## 2024-09-27 DIAGNOSIS — Q89.01 ASPLENIA: ICD-10-CM

## 2024-09-27 DIAGNOSIS — R79.89 ELEVATED FERRITIN: ICD-10-CM

## 2024-09-27 DIAGNOSIS — Z12.11 SCREENING FOR COLON CANCER: ICD-10-CM

## 2024-09-27 DIAGNOSIS — Z13.6 SCREENING FOR CARDIOVASCULAR CONDITION: ICD-10-CM

## 2024-09-27 DIAGNOSIS — Z23 NEED FOR VACCINATION: ICD-10-CM

## 2024-09-27 DIAGNOSIS — Z12.5 PROSTATE CANCER SCREENING: ICD-10-CM

## 2024-09-27 DIAGNOSIS — E78.5 DYSLIPIDEMIA: ICD-10-CM

## 2024-09-27 DIAGNOSIS — D58.0 HEREDITARY SPHEROCYTOSIS (HCC): ICD-10-CM

## 2024-09-27 PROCEDURE — 99214 OFFICE O/P EST MOD 30 MIN: CPT | Mod: 25 | Performed by: INTERNAL MEDICINE

## 2024-09-27 PROCEDURE — 90621 MENB-FHBP VACC 2/3 DOSE IM: CPT | Mod: JZ | Performed by: INTERNAL MEDICINE

## 2024-09-27 PROCEDURE — 90471 IMMUNIZATION ADMIN: CPT | Performed by: INTERNAL MEDICINE

## 2024-09-27 ASSESSMENT — FIBROSIS 4 INDEX: FIB4 SCORE: 0.71

## 2024-09-27 NOTE — PROGRESS NOTES
"Established Patient    Darrius Velasquez is a 59 y.o. male who presents today with the following:    CC:   Chief Complaint   Patient presents with    Referral for screening colonoscopy    Discuss about CT calcium score     Left toe, cyst under 3rd toe  wants looked at.        HPI:       Blisters between 2nd and 3rd toe on plantar surface for 1 day, no pain, no itching    Dyslipidemia, would like to get CT calcium score  Healthful lifestyle measures      Problem   Blister of Left Foot   Dyslipidemia    Elevated LDL  Healthful lifestyle measures       Elevated Ferritin    Hx of elevated ferritin  monitor     Asplenia    Removed as a child due to hereditary spherocytosis       Hereditary Spherocytosis (Hcc)    Diagnosed since childhood  Splenectomy  Monitor CBC          No current outpatient medications on file.     No Known Allergies    Allergies, past medical history, past surgical history, medications, family history, social history reviewed and updated.    ROS Please see HPI    Physical Exam  Vitals: Pulse 68   Temp 36.7 °C (98 °F) (Temporal)   Ht 1.753 m (5' 9\")   Wt 83.5 kg (184 lb)   SpO2 97%   BMI 27.17 kg/m²   Physical Exam  Constitutional:       General: He is not in acute distress.     Appearance: Normal appearance.   HENT:      Head: Normocephalic and atraumatic.      Nose: Nose normal.      Mouth/Throat:      Pharynx: Oropharynx is clear.   Eyes:      Extraocular Movements: Extraocular movements intact.      Conjunctiva/sclera: Conjunctivae normal.   Cardiovascular:      Rate and Rhythm: Normal rate and regular rhythm.   Pulmonary:      Effort: Pulmonary effort is normal.      Breath sounds: Normal breath sounds.   Abdominal:      General: Bowel sounds are normal.      Palpations: Abdomen is soft.      Tenderness: There is no abdominal tenderness.   Musculoskeletal:      Right lower leg: No edema.      Left lower leg: No edema.   Skin:     Comments: Blisters between 2nd and 3rd toe on plantar " surface   Neurological:      Mental Status: He is alert. Mental status is at baseline.   Psychiatric:         Mood and Affect: Mood normal.         Behavior: Behavior normal.         Thought Content: Thought content normal.         Judgment: Judgment normal.         Assessment and Plan    1. Blister of left foot, initial encounter  - Referral to Podiatry    2. Screening for colon cancer  - Referral to GI for Colonoscopy    3. Dyslipidemia  - CT-HEART W/O CONT EVAL CALCIUM; Future  - Lipid Profile; Future  - TSH WITH REFLEX TO FT4; Future    4. Screening for cardiovascular condition  - CT-HEART W/O CONT EVAL CALCIUM; Future    5. Hereditary spherocytosis (HCC)  - CBC WITH DIFFERENTIAL; Future  - Comp Metabolic Panel; Future  - URINALYSIS,CULTURE IF INDICATED; Future  - LDH; Future  - HAPTOGLOBIN; Future  - BILIRUBIN DIRECT; Future  - BILIRUBIN INDIRECT; Future    6. Prostate cancer screening  - PROSTATE SPECIFIC AG SCREENING; Future    7. Elevated ferritin  - FERRITIN; Future  - IRON/TOTAL IRON BIND; Future    8. Wellness examination  - CBC WITH DIFFERENTIAL; Future  - Comp Metabolic Panel; Future  - Lipid Profile; Future  - TSH WITH REFLEX TO FT4; Future  - URINALYSIS,CULTURE IF INDICATED; Future  - LDH; Future  - HAPTOGLOBIN; Future  - BILIRUBIN DIRECT; Future  - BILIRUBIN INDIRECT; Future  - PROSTATE SPECIFIC AG SCREENING; Future  - FERRITIN; Future  - IRON/TOTAL IRON BIND; Future    9. Asplenia    10. Need for vaccination  - Meningococcal Vaccine Serogroup B 2-3 Dose (TRUMENBA)          Follow-up:Return in about 5 months (around 2/27/2025), or if symptoms worsen or fail to improve.    This note was created using voice recognition software. There may be unintended errors in spelling, grammar or content.

## 2025-02-07 ENCOUNTER — APPOINTMENT (OUTPATIENT)
Dept: MEDICAL GROUP | Facility: IMAGING CENTER | Age: 60
End: 2025-02-07
Payer: COMMERCIAL

## 2025-02-07 VITALS
HEART RATE: 62 BPM | WEIGHT: 182 LBS | BODY MASS INDEX: 26.96 KG/M2 | HEIGHT: 69 IN | RESPIRATION RATE: 16 BRPM | DIASTOLIC BLOOD PRESSURE: 76 MMHG | OXYGEN SATURATION: 97 % | TEMPERATURE: 97.6 F | SYSTOLIC BLOOD PRESSURE: 122 MMHG

## 2025-02-07 DIAGNOSIS — Z13.1 DIABETES MELLITUS SCREENING: ICD-10-CM

## 2025-02-07 DIAGNOSIS — D58.0 HEREDITARY SPHEROCYTOSIS (HCC): ICD-10-CM

## 2025-02-07 DIAGNOSIS — E78.5 DYSLIPIDEMIA: ICD-10-CM

## 2025-02-07 DIAGNOSIS — Z90.81 HISTORY OF SPLENECTOMY: ICD-10-CM

## 2025-02-07 DIAGNOSIS — R79.89 ELEVATED FERRITIN: ICD-10-CM

## 2025-02-07 DIAGNOSIS — Z12.11 SCREENING FOR COLON CANCER: ICD-10-CM

## 2025-02-07 DIAGNOSIS — Z23 NEED FOR VACCINATION: ICD-10-CM

## 2025-02-07 DIAGNOSIS — Z12.5 PROSTATE CANCER SCREENING: ICD-10-CM

## 2025-02-07 DIAGNOSIS — Z12.83 SKIN CANCER SCREENING: ICD-10-CM

## 2025-02-07 DIAGNOSIS — E55.9 VITAMIN D INSUFFICIENCY: ICD-10-CM

## 2025-02-07 PROCEDURE — 90619 MENACWY-TT VACCINE IM: CPT

## 2025-02-07 PROCEDURE — 99214 OFFICE O/P EST MOD 30 MIN: CPT | Mod: 25 | Performed by: INTERNAL MEDICINE

## 2025-02-07 PROCEDURE — 3078F DIAST BP <80 MM HG: CPT | Performed by: INTERNAL MEDICINE

## 2025-02-07 PROCEDURE — 90471 IMMUNIZATION ADMIN: CPT

## 2025-02-07 PROCEDURE — 3074F SYST BP LT 130 MM HG: CPT | Performed by: INTERNAL MEDICINE

## 2025-02-07 ASSESSMENT — FIBROSIS 4 INDEX: FIB4 SCORE: 0.71

## 2025-02-07 ASSESSMENT — PATIENT HEALTH QUESTIONNAIRE - PHQ9: CLINICAL INTERPRETATION OF PHQ2 SCORE: 0

## 2025-02-07 NOTE — PROGRESS NOTES
"Established Patient    Darrius Velasquez is a 59 y.o. male who presents today with the following:    CC:   Chief Complaint   Patient presents with    Follow-Up     Did not complete labs  Foot is better       HPI:     Verbal consent was acquired by the patient to use Kartela ambient listening note generation during this visit Yes     History of Present Illness  The patient presents for a routine checkup.      He has lost 2 pounds and aims to lose 5 more.  - Onset: Not specified.  - Duration: Not specified.  - Character: Weight loss of 2 pounds.  - Severity: Aims to lose 5 more pounds.    Loose Bowel Movements  He reports daily loose bowel movements due to dairy and occasionally takes probiotics.  - Onset: Not specified.  - Duration: Daily.  - Character: Loose bowel movements.  - Alleviating/Aggravating Factors: Due to dairy; occasionally takes probiotics.    Other Medical History  He does not take vitamins and has no history of gout. He is not a vegetarian. His ferritin levels, previously 800-900, have decreased. He has not had a colonoscopy in 5-7 years and has not consulted [facility]. He had a dental check-up last Friday. He has a history of stress fractures. He received influenza and COVID-19 vaccines. He saw a hematologist before moving in 2017 or 2018.    IMMUNIZATIONS  - Received influenza vaccine  - Received COVID-19 vaccine    No problems updated.     No current outpatient medications on file.     No Known Allergies    Allergies, past medical history, past surgical history, medications, family history, social history reviewed and updated.    ROS Please see HPI    Physical Exam  Vitals: /76 (BP Location: Left arm, Patient Position: Sitting, BP Cuff Size: Adult)   Pulse 62   Temp 36.4 °C (97.6 °F) (Temporal)   Resp 16   Ht 1.753 m (5' 9\")   Wt 82.6 kg (182 lb)   SpO2 97%   BMI 26.88 kg/m²   Physical Exam  Constitutional:       General: He is not in acute distress.     Appearance: " Normal appearance.   HENT:      Head: Normocephalic and atraumatic.      Nose: Nose normal.      Mouth/Throat:      Pharynx: Oropharynx is clear.   Eyes:      Extraocular Movements: Extraocular movements intact.      Conjunctiva/sclera: Conjunctivae normal.   Cardiovascular:      Rate and Rhythm: Normal rate and regular rhythm.   Pulmonary:      Effort: Pulmonary effort is normal.      Breath sounds: Normal breath sounds.   Abdominal:      General: Bowel sounds are normal.      Palpations: Abdomen is soft.      Tenderness: There is no abdominal tenderness.   Musculoskeletal:      Right lower leg: No edema.      Left lower leg: No edema.   Neurological:      Mental Status: He is alert. Mental status is at baseline.   Psychiatric:         Mood and Affect: Mood normal.         Behavior: Behavior normal.         Thought Content: Thought content normal.         Judgment: Judgment normal.           Assessment and Plan    Assessment & Plan      1. Hereditary spherocytosis (HCC)  - CBC WITH DIFFERENTIAL; Future  - Comp Metabolic Panel; Future  - URINALYSIS,CULTURE IF INDICATED; Future  - FERRITIN; Future  - IRON/TOTAL IRON BIND; Future    2. Elevated ferritin  - CBC WITH DIFFERENTIAL; Future  - FERRITIN; Future  - IRON/TOTAL IRON BIND; Future    3. Dyslipidemia  - Lipid Profile; Future  - TSH WITH REFLEX TO FT4; Future    4. Vitamin D insufficiency  - VITAMIN D,25 HYDROXY (DEFICIENCY); Future  Recommend vitamin D3 6264-9211 international units daily    5. Prostate cancer screening  - PROSTATE SPECIFIC AG SCREENING; Future    6. Diabetes mellitus screening  - Comp Metabolic Panel; Future    7. Screening for colon cancer  - Referral to GI for Colonoscopy    8. Skin cancer screening  - Referral to Dermatology    9. History of splenectomy  - Meningococcal ACWY Conjugate Vaccine (MenQuadfi)    10. Need for vaccination  - Meningococcal ACWY Conjugate Vaccine (MenQuadfi)          Follow-up:Return in about 1 year (around 2/7/2026),  or if symptoms worsen or fail to improve.    This note was created using voice recognition software. There may be unintended errors in spelling, grammar or content.

## 2025-03-07 ENCOUNTER — HOSPITAL ENCOUNTER (OUTPATIENT)
Dept: RADIOLOGY | Facility: MEDICAL CENTER | Age: 60
End: 2025-03-07
Attending: INTERNAL MEDICINE
Payer: COMMERCIAL

## 2025-03-07 DIAGNOSIS — E78.5 DYSLIPIDEMIA: ICD-10-CM

## 2025-03-07 DIAGNOSIS — Z13.6 SCREENING FOR CARDIOVASCULAR CONDITION: ICD-10-CM

## 2025-03-07 PROCEDURE — 4410556 CT-CARDIAC SCORING (SELF PAY ONLY)

## 2025-03-10 ENCOUNTER — RESULTS FOLLOW-UP (OUTPATIENT)
Dept: MEDICAL GROUP | Facility: IMAGING CENTER | Age: 60
End: 2025-03-10
Payer: COMMERCIAL

## 2025-03-21 ENCOUNTER — HOSPITAL ENCOUNTER (OUTPATIENT)
Dept: LAB | Facility: MEDICAL CENTER | Age: 60
End: 2025-03-21
Attending: INTERNAL MEDICINE
Payer: COMMERCIAL

## 2025-03-21 DIAGNOSIS — E55.9 VITAMIN D INSUFFICIENCY: ICD-10-CM

## 2025-03-21 DIAGNOSIS — R79.89 ELEVATED FERRITIN: ICD-10-CM

## 2025-03-21 DIAGNOSIS — Z12.5 PROSTATE CANCER SCREENING: ICD-10-CM

## 2025-03-21 DIAGNOSIS — Z00.00 WELLNESS EXAMINATION: ICD-10-CM

## 2025-03-21 DIAGNOSIS — E78.5 DYSLIPIDEMIA: ICD-10-CM

## 2025-03-21 DIAGNOSIS — Z13.1 DIABETES MELLITUS SCREENING: ICD-10-CM

## 2025-03-21 DIAGNOSIS — D58.0 HEREDITARY SPHEROCYTOSIS (HCC): ICD-10-CM

## 2025-03-21 LAB
25(OH)D3 SERPL-MCNC: 17 NG/ML (ref 30–100)
ALBUMIN SERPL BCP-MCNC: 4 G/DL (ref 3.2–4.9)
ALBUMIN/GLOB SERPL: 1.2 G/DL
ALP SERPL-CCNC: 81 U/L (ref 30–99)
ALT SERPL-CCNC: 24 U/L (ref 2–50)
ANION GAP SERPL CALC-SCNC: 10 MMOL/L (ref 7–16)
APPEARANCE UR: CLEAR
AST SERPL-CCNC: 32 U/L (ref 12–45)
BASOPHILS # BLD AUTO: 0.8 % (ref 0–1.8)
BASOPHILS # BLD: 0.07 K/UL (ref 0–0.12)
BILIRUB CONJ SERPL-MCNC: 0.7 MG/DL (ref 0.1–0.5)
BILIRUB INDIRECT SERPL-MCNC: 1.7 MG/DL (ref 0–1)
BILIRUB SERPL-MCNC: 2.4 MG/DL (ref 0.1–1.5)
BILIRUB UR QL STRIP.AUTO: NEGATIVE
BUN SERPL-MCNC: 14 MG/DL (ref 8–22)
CALCIUM ALBUM COR SERPL-MCNC: 9.2 MG/DL (ref 8.5–10.5)
CALCIUM SERPL-MCNC: 9.2 MG/DL (ref 8.5–10.5)
CHLORIDE SERPL-SCNC: 106 MMOL/L (ref 96–112)
CHOLEST SERPL-MCNC: 168 MG/DL (ref 100–199)
CO2 SERPL-SCNC: 26 MMOL/L (ref 20–33)
COLOR UR: YELLOW
CREAT SERPL-MCNC: 0.77 MG/DL (ref 0.5–1.4)
EOSINOPHIL # BLD AUTO: 0.41 K/UL (ref 0–0.51)
EOSINOPHIL NFR BLD: 4.8 % (ref 0–6.9)
ERYTHROCYTE [DISTWIDTH] IN BLOOD BY AUTOMATED COUNT: 54.3 FL (ref 35.9–50)
FERRITIN SERPL-MCNC: 484 NG/ML (ref 22–322)
GFR SERPLBLD CREATININE-BSD FMLA CKD-EPI: 102 ML/MIN/1.73 M 2
GLOBULIN SER CALC-MCNC: 3.3 G/DL (ref 1.9–3.5)
GLUCOSE SERPL-MCNC: 86 MG/DL (ref 65–99)
GLUCOSE UR STRIP.AUTO-MCNC: NEGATIVE MG/DL
HCT VFR BLD AUTO: 47.8 % (ref 42–52)
HDLC SERPL-MCNC: 42 MG/DL
HGB BLD-MCNC: 15.4 G/DL (ref 14–18)
IMM GRANULOCYTES # BLD AUTO: 0.03 K/UL (ref 0–0.11)
IMM GRANULOCYTES NFR BLD AUTO: 0.4 % (ref 0–0.9)
IRON SATN MFR SERPL: 47 % (ref 15–55)
IRON SERPL-MCNC: 132 UG/DL (ref 50–180)
KETONES UR STRIP.AUTO-MCNC: NEGATIVE MG/DL
LDH SERPL L TO P-CCNC: 279 U/L (ref 107–266)
LDLC SERPL CALC-MCNC: 108 MG/DL
LEUKOCYTE ESTERASE UR QL STRIP.AUTO: NEGATIVE
LYMPHOCYTES # BLD AUTO: 2.55 K/UL (ref 1–4.8)
LYMPHOCYTES NFR BLD: 29.8 % (ref 22–41)
MCH RBC QN AUTO: 31.7 PG (ref 27–33)
MCHC RBC AUTO-ENTMCNC: 32.2 G/DL (ref 32.3–36.5)
MCV RBC AUTO: 98.4 FL (ref 81.4–97.8)
MICRO URNS: NORMAL
MONOCYTES # BLD AUTO: 0.84 K/UL (ref 0–0.85)
MONOCYTES NFR BLD AUTO: 9.8 % (ref 0–13.4)
NEUTROPHILS # BLD AUTO: 4.67 K/UL (ref 1.82–7.42)
NEUTROPHILS NFR BLD: 54.4 % (ref 44–72)
NITRITE UR QL STRIP.AUTO: NEGATIVE
NRBC # BLD AUTO: 0 K/UL
NRBC BLD-RTO: 0 /100 WBC (ref 0–0.2)
PH UR STRIP.AUTO: 5.5 [PH] (ref 5–8)
PLATELET # BLD AUTO: 407 K/UL (ref 164–446)
PMV BLD AUTO: 10.4 FL (ref 9–12.9)
POTASSIUM SERPL-SCNC: 4 MMOL/L (ref 3.6–5.5)
PROT SERPL-MCNC: 7.3 G/DL (ref 6–8.2)
PROT UR QL STRIP: NEGATIVE MG/DL
PSA SERPL DL<=0.01 NG/ML-MCNC: 2.65 NG/ML (ref 0–4)
RBC # BLD AUTO: 4.86 M/UL (ref 4.7–6.1)
RBC UR QL AUTO: NEGATIVE
SODIUM SERPL-SCNC: 142 MMOL/L (ref 135–145)
SP GR UR STRIP.AUTO: 1.02
TIBC SERPL-MCNC: 283 UG/DL (ref 250–450)
TRIGL SERPL-MCNC: 88 MG/DL (ref 0–149)
TSH SERPL DL<=0.005 MIU/L-ACNC: 1.59 UIU/ML (ref 0.38–5.33)
UIBC SERPL-MCNC: 151 UG/DL (ref 110–370)
UROBILINOGEN UR STRIP.AUTO-MCNC: 0.2 EU/DL
WBC # BLD AUTO: 8.6 K/UL (ref 4.8–10.8)

## 2025-03-21 PROCEDURE — 82728 ASSAY OF FERRITIN: CPT

## 2025-03-21 PROCEDURE — 83010 ASSAY OF HAPTOGLOBIN QUANT: CPT

## 2025-03-21 PROCEDURE — 84443 ASSAY THYROID STIM HORMONE: CPT

## 2025-03-21 PROCEDURE — 83615 LACTATE (LD) (LDH) ENZYME: CPT

## 2025-03-21 PROCEDURE — 82306 VITAMIN D 25 HYDROXY: CPT

## 2025-03-21 PROCEDURE — 80053 COMPREHEN METABOLIC PANEL: CPT

## 2025-03-21 PROCEDURE — 36415 COLL VENOUS BLD VENIPUNCTURE: CPT

## 2025-03-21 PROCEDURE — 85025 COMPLETE CBC W/AUTO DIFF WBC: CPT

## 2025-03-21 PROCEDURE — 82248 BILIRUBIN DIRECT: CPT

## 2025-03-21 PROCEDURE — 84153 ASSAY OF PSA TOTAL: CPT

## 2025-03-21 PROCEDURE — 83550 IRON BINDING TEST: CPT

## 2025-03-21 PROCEDURE — 80061 LIPID PANEL: CPT

## 2025-03-21 PROCEDURE — 81003 URINALYSIS AUTO W/O SCOPE: CPT

## 2025-03-21 PROCEDURE — 83540 ASSAY OF IRON: CPT

## 2025-03-24 ENCOUNTER — RESULTS FOLLOW-UP (OUTPATIENT)
Dept: MEDICAL GROUP | Facility: IMAGING CENTER | Age: 60
End: 2025-03-24
Payer: COMMERCIAL

## 2025-03-24 DIAGNOSIS — Z71.9 ENCOUNTER FOR CONSULTATION: ICD-10-CM

## 2025-03-24 DIAGNOSIS — D58.0 HEREDITARY SPHEROCYTOSIS (HCC): ICD-10-CM

## 2025-03-24 DIAGNOSIS — E55.9 VITAMIN D DEFICIENCY: ICD-10-CM

## 2025-03-24 DIAGNOSIS — Z90.81 HISTORY OF SPLENECTOMY: ICD-10-CM

## 2025-03-24 LAB — HAPTOGLOB SERPL-MCNC: 64 MG/DL (ref 30–200)

## 2025-03-24 RX ORDER — ERGOCALCIFEROL 1.25 MG/1
50000 CAPSULE, LIQUID FILLED ORAL
Qty: 12 CAPSULE | Refills: 0 | Status: SHIPPED | OUTPATIENT
Start: 2025-03-24

## 2025-03-25 NOTE — PROGRESS NOTES
Patient: 59-year-old male  History: Hereditary spherocytosis, status post splenectomy  Reason for consult: Evaluation of elevated MCV, ferritin, bilirubin, and LDH    Clinical Summary:    This patient has a known history of hereditary spherocytosis and underwent splenectomy. Recent labs show:    Ferritin: 484 ng/mL (H)    MCV: 98.4 fL (H)    Total Bilirubin: 2.4 mg/dL (H)    LDH: 279 U/L (H)    Haptoglobin: 64 mg/dL    Hemoglobin and reticulocytes: Within normal range    Iron saturation: 47%    Vitamin D: 17 ng/mL (L) - currently being addressed    He previously had a negative hemochromatosis gene panel (C282Y, H63D, S65C all negative on 5/18/23).    Question for Hematology:  In the context of his known hereditary spherocytosis and post-splenectomy status, do these lab findings--particularly the elevated MCV, ferritin, bilirubin, and LDH--warrant further workup or specific monitoring? Any additional recommendations would be appreciated.      Encounter for consultation  -     E-consult to Hematology/Oncology    Hereditary spherocytosis (HCC)  -     E-consult to Hematology/Oncology    History of splenectomy  -     E-consult to Hematology/Oncology    Vitamin D deficiency  -     vitamin D2, Ergocalciferol, (DRISDOL) 1.25 MG (30004 UT) Cap capsule; Take 1 Capsule by mouth every 7 days.  Recommend prescription Vitamin D3 50,000 international units weekly for 12 weeks (sent to pharmacy), then over the counter vitamin D3 7760-6309 international units daily             Latest Reference Range & Units 03/21/25 06:33   WBC 4.8 - 10.8 K/uL 8.6   RBC 4.70 - 6.10 M/uL 4.86   Hemoglobin 14.0 - 18.0 g/dL 15.4   Hematocrit 42.0 - 52.0 % 47.8   MCV 81.4 - 97.8 fL 98.4 (H)   MCH 27.0 - 33.0 pg 31.7   MCHC 32.3 - 36.5 g/dL 32.2 (L)   RDW 35.9 - 50.0 fL 54.3 (H)   Platelet Count 164 - 446 K/uL 407   MPV 9.0 - 12.9 fL 10.4   Neutrophils-Polys 44.00 - 72.00 % 54.40   Neutrophils (Absolute) 1.82 - 7.42 K/uL 4.67   Lymphocytes 22.00 -  41.00 % 29.80   Lymphs (Absolute) 1.00 - 4.80 K/uL 2.55   Monocytes 0.00 - 13.40 % 9.80   Monos (Absolute) 0.00 - 0.85 K/uL 0.84   Eosinophils 0.00 - 6.90 % 4.80   Eos (Absolute) 0.00 - 0.51 K/uL 0.41   Basophils 0.00 - 1.80 % 0.80   Baso (Absolute) 0.00 - 0.12 K/uL 0.07   Immature Granulocytes 0.00 - 0.90 % 0.40   Immature Granulocytes (abs) 0.00 - 0.11 K/uL 0.03   Nucleated RBC 0.00 - 0.20 /100 WBC 0.00   NRBC (Absolute) K/uL 0.00   Sodium 135 - 145 mmol/L 142   Potassium 3.6 - 5.5 mmol/L 4.0   Chloride 96 - 112 mmol/L 106   Co2 20 - 33 mmol/L 26   Anion Gap 7.0 - 16.0  10.0   Glucose 65 - 99 mg/dL 86   Bun 8 - 22 mg/dL 14   Creatinine 0.50 - 1.40 mg/dL 0.77   GFR (CKD-EPI) >60 mL/min/1.73 m 2 102   Calcium 8.5 - 10.5 mg/dL 9.2   Correct Calcium 8.5 - 10.5 mg/dL 9.2   AST(SGOT) 12 - 45 U/L 32   ALT(SGPT) 2 - 50 U/L 24   Alkaline Phosphatase 30 - 99 U/L 81   Total Bilirubin 0.1 - 1.5 mg/dL 2.4 (H)   Direct Bilirubin 0.1 - 0.5 mg/dL 0.7 (H)   Indirect Bilirubin 0.0 - 1.0 mg/dL 1.7 (H)   Albumin 3.2 - 4.9 g/dL 4.0   Total Protein 6.0 - 8.2 g/dL 7.3   Globulin 1.9 - 3.5 g/dL 3.3   A-G Ratio g/dL 1.2   LDH Total 107 - 266 U/L 279 (H)   Iron 50 - 180 ug/dL 132   Total Iron Binding 250 - 450 ug/dL 283   % Saturation 15 - 55 % 47   Unsat Iron Binding 110 - 370 ug/dL 151   Cholesterol,Tot 100 - 199 mg/dL 168   Triglycerides 0 - 149 mg/dL 88   HDL >=40 mg/dL 42   LDL <100 mg/dL 108 (H)   Urobilinogen, Urine <=1.0 EU/dL 0.2   Color  Yellow   Character  Clear   Specific Gravity <1.035  1.017   Ph 5.0 - 8.0  5.5   Glucose Negative mg/dL Negative   Ketones Negative mg/dL Negative   Bilirubin Negative  Negative   Occult Blood Negative  Negative   Protein Negative mg/dL Negative   Nitrite Negative  Negative   Leukocyte Esterase Negative  Negative   Micro Urine Req  see below   25-Hydroxy   Vitamin D 25 30 - 100 ng/mL 17 (L)   Ferritin 22.0 - 322.0 ng/mL 484.0 (H)   Haptoglobin 30 - 200 mg/dL 64   Prostatic Specific Antigen Tot  0.00 - 4.00 ng/mL 2.65   TSH 0.380 - 5.330 uIU/mL 1.590   (H): Data is abnormally high  (L): Data is abnormally low

## 2025-03-26 ENCOUNTER — E-CONSULT (OUTPATIENT)
Dept: HEMATOLOGY ONCOLOGY | Facility: MEDICAL CENTER | Age: 60
End: 2025-03-26
Payer: COMMERCIAL

## 2025-03-26 DIAGNOSIS — Z71.9 ENCOUNTER FOR CONSULTATION: ICD-10-CM

## 2025-03-26 NOTE — PROGRESS NOTES
E-Consult Response     After careful review of the patient's information available in the medical record, the following are my findings and recommendations:    Reason for consult:  History of hereditary spherocytosis, slightly elevated bilirubin and MCV as well as LDH.     Summary of data reviewed: Patient's labs are overall fairly stable. LDH is stable though mildly elevated, bilirubin is fairly stable, and no signs of any hemolysis.     Recommendations: Would continue monitoring with routine labs for now. This is consistent with HS and with the spleen removed, things have been stable. A reticulocyte count will likely be marginally elevated as well, and overall he is well-compensated.     E-Consult Time: 7 minutes were spent with >50% of the total time spent reviewing items outlined in the summary of data reviewed (Use code [id number])    Jean Goff D.O.

## 2025-06-10 DIAGNOSIS — E55.9 VITAMIN D DEFICIENCY: ICD-10-CM

## 2025-06-10 RX ORDER — ERGOCALCIFEROL 1.25 MG/1
50000 CAPSULE, LIQUID FILLED ORAL
Qty: 4 CAPSULE | Refills: 0 | Status: SHIPPED | OUTPATIENT
Start: 2025-06-10

## 2025-06-10 NOTE — TELEPHONE ENCOUNTER
Received request via: Pharmacy    Was the patient seen in the last year in this department? Yes    Does the patient have an active prescription (recently filled or refills available) for medication(s) requested? No    Pharmacy Name: [id number]    Does the patient have [facility] Plus and need 100-day supply? (This applies to ALL medications) Patient does not have SCP